# Patient Record
Sex: MALE | Race: WHITE | Employment: OTHER | URBAN - METROPOLITAN AREA
[De-identification: names, ages, dates, MRNs, and addresses within clinical notes are randomized per-mention and may not be internally consistent; named-entity substitution may affect disease eponyms.]

---

## 2018-10-29 ENCOUNTER — APPOINTMENT (OUTPATIENT)
Dept: CT IMAGING | Age: 83
End: 2018-10-29
Payer: MEDICARE

## 2018-10-29 ENCOUNTER — APPOINTMENT (OUTPATIENT)
Dept: GENERAL RADIOLOGY | Age: 83
End: 2018-10-29
Payer: MEDICARE

## 2018-10-29 ENCOUNTER — HOSPITAL ENCOUNTER (EMERGENCY)
Age: 83
Discharge: OTHER INSTITUTION | End: 2018-10-29
Attending: EMERGENCY MEDICINE
Payer: MEDICARE

## 2018-10-29 VITALS
WEIGHT: 170 LBS | SYSTOLIC BLOOD PRESSURE: 150 MMHG | BODY MASS INDEX: 21.83 KG/M2 | DIASTOLIC BLOOD PRESSURE: 73 MMHG | RESPIRATION RATE: 16 BRPM | OXYGEN SATURATION: 96 % | TEMPERATURE: 97.6 F | HEART RATE: 73 BPM

## 2018-10-29 DIAGNOSIS — S06.5XAA SUBDURAL HEMATOMA: Primary | ICD-10-CM

## 2018-10-29 LAB
A/G RATIO: 1.1 (ref 1.1–2.2)
ALBUMIN SERPL-MCNC: 3.8 G/DL (ref 3.4–5)
ALP BLD-CCNC: 97 U/L (ref 40–129)
ALT SERPL-CCNC: 9 U/L (ref 10–40)
ANION GAP SERPL CALCULATED.3IONS-SCNC: 12 MMOL/L (ref 3–16)
AST SERPL-CCNC: 11 U/L (ref 15–37)
BACTERIA: ABNORMAL /HPF
BASOPHILS ABSOLUTE: 0.2 K/UL (ref 0–0.2)
BASOPHILS RELATIVE PERCENT: 1.2 %
BILIRUB SERPL-MCNC: 0.8 MG/DL (ref 0–1)
BILIRUBIN URINE: NEGATIVE
BLOOD, URINE: ABNORMAL
BUN BLDV-MCNC: 29 MG/DL (ref 7–20)
CALCIUM SERPL-MCNC: 9.9 MG/DL (ref 8.3–10.6)
CHLORIDE BLD-SCNC: 101 MMOL/L (ref 99–110)
CLARITY: CLEAR
CO2: 26 MMOL/L (ref 21–32)
COLOR: YELLOW
CREAT SERPL-MCNC: 1 MG/DL (ref 0.8–1.3)
EKG ATRIAL RATE: 80 BPM
EKG DIAGNOSIS: NORMAL
EKG P AXIS: 52 DEGREES
EKG P-R INTERVAL: 158 MS
EKG Q-T INTERVAL: 404 MS
EKG QRS DURATION: 96 MS
EKG QTC CALCULATION (BAZETT): 465 MS
EKG R AXIS: 52 DEGREES
EKG T AXIS: 76 DEGREES
EKG VENTRICULAR RATE: 80 BPM
EOSINOPHILS ABSOLUTE: 0.3 K/UL (ref 0–0.6)
EOSINOPHILS RELATIVE PERCENT: 2.4 %
GFR AFRICAN AMERICAN: >60
GFR NON-AFRICAN AMERICAN: >60
GLOBULIN: 3.4 G/DL
GLUCOSE BLD-MCNC: 132 MG/DL (ref 70–99)
GLUCOSE BLD-MCNC: 133 MG/DL (ref 70–99)
GLUCOSE URINE: NEGATIVE MG/DL
HCT VFR BLD CALC: 33.3 % (ref 40.5–52.5)
HEMOGLOBIN: 11.2 G/DL (ref 13.5–17.5)
INR BLD: 1.14 (ref 0.86–1.14)
KETONES, URINE: NEGATIVE MG/DL
LEUKOCYTE ESTERASE, URINE: NEGATIVE
LYMPHOCYTES ABSOLUTE: 2.8 K/UL (ref 1–5.1)
LYMPHOCYTES RELATIVE PERCENT: 19.9 %
MAGNESIUM: 1.7 MG/DL (ref 1.8–2.4)
MCH RBC QN AUTO: 30.2 PG (ref 26–34)
MCHC RBC AUTO-ENTMCNC: 33.5 G/DL (ref 31–36)
MCV RBC AUTO: 90 FL (ref 80–100)
MICROSCOPIC EXAMINATION: YES
MONOCYTES ABSOLUTE: 1.4 K/UL (ref 0–1.3)
MONOCYTES RELATIVE PERCENT: 9.5 %
NEUTROPHILS ABSOLUTE: 9.6 K/UL (ref 1.7–7.7)
NEUTROPHILS RELATIVE PERCENT: 67 %
NITRITE, URINE: NEGATIVE
PDW BLD-RTO: 14.4 % (ref 12.4–15.4)
PERFORMED ON: ABNORMAL
PH UA: 6
PLATELET # BLD: 510 K/UL (ref 135–450)
PLATELET SLIDE REVIEW: ABNORMAL
PMV BLD AUTO: 7 FL (ref 5–10.5)
POTASSIUM SERPL-SCNC: 4 MMOL/L (ref 3.5–5.1)
PRO-BNP: 73 PG/ML (ref 0–449)
PROTEIN UA: NEGATIVE MG/DL
PROTHROMBIN TIME: 13 SEC (ref 9.8–13)
RBC # BLD: 3.7 M/UL (ref 4.2–5.9)
RBC # BLD: NORMAL 10*6/UL
RBC UA: ABNORMAL /HPF (ref 0–2)
SLIDE REVIEW: ABNORMAL
SODIUM BLD-SCNC: 139 MMOL/L (ref 136–145)
SPECIFIC GRAVITY UA: 1.01
TOTAL PROTEIN: 7.2 G/DL (ref 6.4–8.2)
TROPONIN: <0.01 NG/ML
URINE TYPE: ABNORMAL
UROBILINOGEN, URINE: 0.2 E.U./DL
WBC # BLD: 14.3 K/UL (ref 4–11)
WBC UA: ABNORMAL /HPF (ref 0–5)

## 2018-10-29 PROCEDURE — 70450 CT HEAD/BRAIN W/O DYE: CPT

## 2018-10-29 PROCEDURE — 70496 CT ANGIOGRAPHY HEAD: CPT

## 2018-10-29 PROCEDURE — 6360000004 HC RX CONTRAST MEDICATION: Performed by: EMERGENCY MEDICINE

## 2018-10-29 PROCEDURE — 83880 ASSAY OF NATRIURETIC PEPTIDE: CPT

## 2018-10-29 PROCEDURE — 99291 CRITICAL CARE FIRST HOUR: CPT

## 2018-10-29 PROCEDURE — 93010 ELECTROCARDIOGRAM REPORT: CPT | Performed by: INTERNAL MEDICINE

## 2018-10-29 PROCEDURE — 80053 COMPREHEN METABOLIC PANEL: CPT

## 2018-10-29 PROCEDURE — 85610 PROTHROMBIN TIME: CPT

## 2018-10-29 PROCEDURE — 84484 ASSAY OF TROPONIN QUANT: CPT

## 2018-10-29 PROCEDURE — 6360000002 HC RX W HCPCS: Performed by: EMERGENCY MEDICINE

## 2018-10-29 PROCEDURE — 85025 COMPLETE CBC W/AUTO DIFF WBC: CPT

## 2018-10-29 PROCEDURE — 93005 ELECTROCARDIOGRAM TRACING: CPT | Performed by: EMERGENCY MEDICINE

## 2018-10-29 PROCEDURE — 99292 CRITICAL CARE ADDL 30 MIN: CPT

## 2018-10-29 PROCEDURE — 81001 URINALYSIS AUTO W/SCOPE: CPT

## 2018-10-29 PROCEDURE — 70498 CT ANGIOGRAPHY NECK: CPT

## 2018-10-29 PROCEDURE — 83735 ASSAY OF MAGNESIUM: CPT

## 2018-10-29 PROCEDURE — 71045 X-RAY EXAM CHEST 1 VIEW: CPT

## 2018-10-29 RX ORDER — LEVOTHYROXINE SODIUM 0.15 MG/1
150 TABLET ORAL
COMMUNITY

## 2018-10-29 RX ORDER — GINSENG 100 MG
1 CAPSULE ORAL
COMMUNITY
Start: 2018-10-22 | End: 2020-10-13 | Stop reason: ALTCHOICE

## 2018-10-29 RX ORDER — POLYETHYLENE GLYCOL 3350 17 G/17G
17 POWDER, FOR SOLUTION ORAL
COMMUNITY
Start: 2018-10-22 | End: 2020-10-13 | Stop reason: ALTCHOICE

## 2018-10-29 RX ORDER — CHLORTHALIDONE 25 MG/1
12.5 TABLET ORAL
COMMUNITY
End: 2020-10-13 | Stop reason: ALTCHOICE

## 2018-10-29 RX ORDER — HEPARIN SODIUM 5000 [USP'U]/ML
5000 INJECTION, SOLUTION INTRAVENOUS; SUBCUTANEOUS
COMMUNITY
Start: 2018-10-22 | End: 2020-10-13 | Stop reason: ALTCHOICE

## 2018-10-29 RX ORDER — MAGNESIUM SULFATE HEPTAHYDRATE 500 MG/ML
2 INJECTION, SOLUTION INTRAMUSCULAR; INTRAVENOUS ONCE
Status: DISCONTINUED | OUTPATIENT
Start: 2018-10-29 | End: 2018-10-29 | Stop reason: CLARIF

## 2018-10-29 RX ORDER — SENNA AND DOCUSATE SODIUM 50; 8.6 MG/1; MG/1
1 TABLET, FILM COATED ORAL
COMMUNITY
Start: 2018-10-22 | End: 2020-10-13 | Stop reason: ALTCHOICE

## 2018-10-29 RX ORDER — TRAMADOL HYDROCHLORIDE 50 MG/1
50 TABLET ORAL EVERY 6 HOURS PRN
Status: ON HOLD | COMMUNITY
End: 2020-10-16 | Stop reason: HOSPADM

## 2018-10-29 RX ORDER — MAGNESIUM SULFATE IN WATER 40 MG/ML
2 INJECTION, SOLUTION INTRAVENOUS ONCE
Status: COMPLETED | OUTPATIENT
Start: 2018-10-29 | End: 2018-10-29

## 2018-10-29 RX ADMIN — IOPAMIDOL 80 ML: 755 INJECTION, SOLUTION INTRAVENOUS at 09:50

## 2018-10-29 RX ADMIN — MAGNESIUM SULFATE HEPTAHYDRATE 2 G: 40 INJECTION, SOLUTION INTRAVENOUS at 09:38
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
## 2020-10-13 ENCOUNTER — APPOINTMENT (OUTPATIENT)
Dept: GENERAL RADIOLOGY | Age: 85
DRG: 682 | End: 2020-10-13
Payer: MEDICARE

## 2020-10-13 ENCOUNTER — APPOINTMENT (OUTPATIENT)
Dept: CT IMAGING | Age: 85
DRG: 682 | End: 2020-10-13
Payer: MEDICARE

## 2020-10-13 ENCOUNTER — HOSPITAL ENCOUNTER (INPATIENT)
Age: 85
LOS: 2 days | Discharge: NURSING, REHAB OR LONG-TERM CARE | DRG: 682 | End: 2020-10-16
Attending: EMERGENCY MEDICINE | Admitting: INTERNAL MEDICINE
Payer: MEDICARE

## 2020-10-13 LAB
A/G RATIO: 1.8 (ref 1.1–2.2)
ALBUMIN SERPL-MCNC: 4.3 G/DL (ref 3.4–5)
ALP BLD-CCNC: 55 U/L (ref 40–129)
ALT SERPL-CCNC: 23 U/L (ref 10–40)
ANION GAP SERPL CALCULATED.3IONS-SCNC: 12 MMOL/L (ref 3–16)
AST SERPL-CCNC: 46 U/L (ref 15–37)
BASOPHILS ABSOLUTE: 0.1 K/UL (ref 0–0.2)
BASOPHILS RELATIVE PERCENT: 1.6 %
BILIRUB SERPL-MCNC: 0.5 MG/DL (ref 0–1)
BUN BLDV-MCNC: 23 MG/DL (ref 7–20)
CALCIUM SERPL-MCNC: 10.1 MG/DL (ref 8.3–10.6)
CHLORIDE BLD-SCNC: 95 MMOL/L (ref 99–110)
CO2: 28 MMOL/L (ref 21–32)
CREAT SERPL-MCNC: 1.6 MG/DL (ref 0.8–1.3)
EOSINOPHILS ABSOLUTE: 0.1 K/UL (ref 0–0.6)
EOSINOPHILS RELATIVE PERCENT: 0.8 %
GFR AFRICAN AMERICAN: 50
GFR NON-AFRICAN AMERICAN: 41
GLOBULIN: 2.4 G/DL
GLUCOSE BLD-MCNC: 81 MG/DL (ref 70–99)
HCT VFR BLD CALC: 39.8 % (ref 40.5–52.5)
HEMOGLOBIN: 13.9 G/DL (ref 13.5–17.5)
LIPASE: 49 U/L (ref 13–60)
LYMPHOCYTES ABSOLUTE: 3.7 K/UL (ref 1–5.1)
LYMPHOCYTES RELATIVE PERCENT: 44.3 %
MAGNESIUM: 1.4 MG/DL (ref 1.8–2.4)
MCH RBC QN AUTO: 33.1 PG (ref 26–34)
MCHC RBC AUTO-ENTMCNC: 34.9 G/DL (ref 31–36)
MCV RBC AUTO: 94.7 FL (ref 80–100)
MONOCYTES ABSOLUTE: 0.5 K/UL (ref 0–1.3)
MONOCYTES RELATIVE PERCENT: 5.9 %
NEUTROPHILS ABSOLUTE: 4 K/UL (ref 1.7–7.7)
NEUTROPHILS RELATIVE PERCENT: 47.4 %
PDW BLD-RTO: 16.1 % (ref 12.4–15.4)
PLATELET # BLD: 284 K/UL (ref 135–450)
PMV BLD AUTO: 8 FL (ref 5–10.5)
POTASSIUM SERPL-SCNC: 2.9 MMOL/L (ref 3.5–5.1)
PRO-BNP: 204 PG/ML (ref 0–449)
RBC # BLD: 4.2 M/UL (ref 4.2–5.9)
SODIUM BLD-SCNC: 135 MMOL/L (ref 136–145)
TOTAL PROTEIN: 6.7 G/DL (ref 6.4–8.2)
TROPONIN: <0.01 NG/ML
WBC # BLD: 8.3 K/UL (ref 4–11)

## 2020-10-13 PROCEDURE — 83036 HEMOGLOBIN GLYCOSYLATED A1C: CPT

## 2020-10-13 PROCEDURE — 71045 X-RAY EXAM CHEST 1 VIEW: CPT

## 2020-10-13 PROCEDURE — 84484 ASSAY OF TROPONIN QUANT: CPT

## 2020-10-13 PROCEDURE — 84480 ASSAY TRIIODOTHYRONINE (T3): CPT

## 2020-10-13 PROCEDURE — G0480 DRUG TEST DEF 1-7 CLASSES: HCPCS

## 2020-10-13 PROCEDURE — 72125 CT NECK SPINE W/O DYE: CPT

## 2020-10-13 PROCEDURE — 96361 HYDRATE IV INFUSION ADD-ON: CPT

## 2020-10-13 PROCEDURE — 83735 ASSAY OF MAGNESIUM: CPT

## 2020-10-13 PROCEDURE — 83880 ASSAY OF NATRIURETIC PEPTIDE: CPT

## 2020-10-13 PROCEDURE — 80053 COMPREHEN METABOLIC PANEL: CPT

## 2020-10-13 PROCEDURE — 85025 COMPLETE CBC W/AUTO DIFF WBC: CPT

## 2020-10-13 PROCEDURE — 93005 ELECTROCARDIOGRAM TRACING: CPT | Performed by: EMERGENCY MEDICINE

## 2020-10-13 PROCEDURE — 84439 ASSAY OF FREE THYROXINE: CPT

## 2020-10-13 PROCEDURE — 99285 EMERGENCY DEPT VISIT HI MDM: CPT

## 2020-10-13 PROCEDURE — 6370000000 HC RX 637 (ALT 250 FOR IP): Performed by: EMERGENCY MEDICINE

## 2020-10-13 PROCEDURE — 83690 ASSAY OF LIPASE: CPT

## 2020-10-13 PROCEDURE — 84443 ASSAY THYROID STIM HORMONE: CPT

## 2020-10-13 PROCEDURE — 2580000003 HC RX 258: Performed by: EMERGENCY MEDICINE

## 2020-10-13 PROCEDURE — 36415 COLL VENOUS BLD VENIPUNCTURE: CPT

## 2020-10-13 PROCEDURE — 70450 CT HEAD/BRAIN W/O DYE: CPT

## 2020-10-13 RX ORDER — 0.9 % SODIUM CHLORIDE 0.9 %
1000 INTRAVENOUS SOLUTION INTRAVENOUS ONCE
Status: COMPLETED | OUTPATIENT
Start: 2020-10-13 | End: 2020-10-14

## 2020-10-13 RX ORDER — MAGNESIUM SULFATE IN WATER 40 MG/ML
2 INJECTION, SOLUTION INTRAVENOUS ONCE
Status: COMPLETED | OUTPATIENT
Start: 2020-10-14 | End: 2020-10-14

## 2020-10-13 RX ADMIN — LEVOTHYROXINE SODIUM 150 MCG: 25 TABLET ORAL at 22:59

## 2020-10-13 RX ADMIN — SODIUM CHLORIDE 1000 ML: 9 INJECTION, SOLUTION INTRAVENOUS at 23:00

## 2020-10-14 PROBLEM — N17.9 AKI (ACUTE KIDNEY INJURY) (HCC): Status: ACTIVE | Noted: 2020-10-14

## 2020-10-14 PROBLEM — E43 SEVERE PROTEIN-CALORIE MALNUTRITION (HCC): Status: ACTIVE | Noted: 2020-10-14

## 2020-10-14 LAB
A/G RATIO: 1.8 (ref 1.1–2.2)
ALBUMIN SERPL-MCNC: 3.8 G/DL (ref 3.4–5)
ALP BLD-CCNC: 53 U/L (ref 40–129)
ALT SERPL-CCNC: 20 U/L (ref 10–40)
AMPHETAMINE SCREEN, URINE: NORMAL
ANION GAP SERPL CALCULATED.3IONS-SCNC: 10 MMOL/L (ref 3–16)
ANION GAP SERPL CALCULATED.3IONS-SCNC: 15 MMOL/L (ref 3–16)
AST SERPL-CCNC: 37 U/L (ref 15–37)
BACTERIA: ABNORMAL /HPF
BARBITURATE SCREEN URINE: NORMAL
BASOPHILS ABSOLUTE: 0 K/UL (ref 0–0.2)
BASOPHILS RELATIVE PERCENT: 0.4 %
BENZODIAZEPINE SCREEN, URINE: NORMAL
BILIRUB SERPL-MCNC: 0.4 MG/DL (ref 0–1)
BILIRUBIN URINE: NEGATIVE
BLOOD, URINE: ABNORMAL
BUN BLDV-MCNC: 19 MG/DL (ref 7–20)
BUN BLDV-MCNC: 19 MG/DL (ref 7–20)
CALCIUM SERPL-MCNC: 9.3 MG/DL (ref 8.3–10.6)
CALCIUM SERPL-MCNC: 9.4 MG/DL (ref 8.3–10.6)
CANNABINOID SCREEN URINE: NORMAL
CHLORIDE BLD-SCNC: 95 MMOL/L (ref 99–110)
CHLORIDE BLD-SCNC: 99 MMOL/L (ref 99–110)
CLARITY: ABNORMAL
CO2: 24 MMOL/L (ref 21–32)
CO2: 28 MMOL/L (ref 21–32)
COCAINE METABOLITE SCREEN URINE: NORMAL
COLOR: YELLOW
CREAT SERPL-MCNC: 1.3 MG/DL (ref 0.8–1.3)
CREAT SERPL-MCNC: 1.4 MG/DL (ref 0.8–1.3)
EKG ATRIAL RATE: 62 BPM
EKG DIAGNOSIS: NORMAL
EKG P AXIS: 33 DEGREES
EKG P-R INTERVAL: 180 MS
EKG Q-T INTERVAL: 450 MS
EKG QRS DURATION: 106 MS
EKG QTC CALCULATION (BAZETT): 456 MS
EKG R AXIS: 52 DEGREES
EKG T AXIS: 47 DEGREES
EKG VENTRICULAR RATE: 62 BPM
EOSINOPHILS ABSOLUTE: 0.1 K/UL (ref 0–0.6)
EOSINOPHILS RELATIVE PERCENT: 0.9 %
ESTIMATED AVERAGE GLUCOSE: 111.2 MG/DL
ETHANOL: NORMAL MG/DL (ref 0–0.08)
GFR AFRICAN AMERICAN: 58
GFR AFRICAN AMERICAN: >60
GFR NON-AFRICAN AMERICAN: 48
GFR NON-AFRICAN AMERICAN: 52
GLOBULIN: 2.1 G/DL
GLUCOSE BLD-MCNC: 110 MG/DL (ref 70–99)
GLUCOSE BLD-MCNC: 112 MG/DL (ref 70–99)
GLUCOSE BLD-MCNC: 120 MG/DL (ref 70–99)
GLUCOSE BLD-MCNC: 84 MG/DL (ref 70–99)
GLUCOSE BLD-MCNC: 90 MG/DL (ref 70–99)
GLUCOSE BLD-MCNC: 94 MG/DL (ref 70–99)
GLUCOSE URINE: NEGATIVE MG/DL
HBA1C MFR BLD: 5.5 %
HCT VFR BLD CALC: 37.3 % (ref 40.5–52.5)
HEMOGLOBIN: 13 G/DL (ref 13.5–17.5)
KETONES, URINE: NEGATIVE MG/DL
LEUKOCYTE ESTERASE, URINE: ABNORMAL
LYMPHOCYTES ABSOLUTE: 3.2 K/UL (ref 1–5.1)
LYMPHOCYTES RELATIVE PERCENT: 41.4 %
Lab: NORMAL
MAGNESIUM: 1.6 MG/DL (ref 1.8–2.4)
MAGNESIUM: 1.9 MG/DL (ref 1.8–2.4)
MCH RBC QN AUTO: 32.8 PG (ref 26–34)
MCHC RBC AUTO-ENTMCNC: 34.8 G/DL (ref 31–36)
MCV RBC AUTO: 94.2 FL (ref 80–100)
METHADONE SCREEN, URINE: NORMAL
MICROSCOPIC EXAMINATION: YES
MONOCYTES ABSOLUTE: 0.6 K/UL (ref 0–1.3)
MONOCYTES RELATIVE PERCENT: 7.1 %
NEUTROPHILS ABSOLUTE: 3.9 K/UL (ref 1.7–7.7)
NEUTROPHILS RELATIVE PERCENT: 50.2 %
NITRITE, URINE: NEGATIVE
OPIATE SCREEN URINE: NORMAL
OXYCODONE URINE: NORMAL
PDW BLD-RTO: 15.8 % (ref 12.4–15.4)
PERFORMED ON: ABNORMAL
PERFORMED ON: ABNORMAL
PERFORMED ON: NORMAL
PERFORMED ON: NORMAL
PH UA: 6
PH UA: 6 (ref 5–8)
PHENCYCLIDINE SCREEN URINE: NORMAL
PHOSPHORUS: 1.8 MG/DL (ref 2.5–4.9)
PLATELET # BLD: 248 K/UL (ref 135–450)
PMV BLD AUTO: 8.3 FL (ref 5–10.5)
POTASSIUM REFLEX MAGNESIUM: 2.7 MMOL/L (ref 3.5–5.1)
POTASSIUM REFLEX MAGNESIUM: 3.3 MMOL/L (ref 3.5–5.1)
PROPOXYPHENE SCREEN: NORMAL
PROTEIN UA: 30 MG/DL
RBC # BLD: 3.96 M/UL (ref 4.2–5.9)
RBC UA: ABNORMAL /HPF (ref 0–4)
SODIUM BLD-SCNC: 133 MMOL/L (ref 136–145)
SODIUM BLD-SCNC: 138 MMOL/L (ref 136–145)
SPECIFIC GRAVITY UA: 1.02 (ref 1–1.03)
T3 TOTAL: <0.2 NG/ML (ref 0.8–2)
T4 FREE: <0.1 NG/DL (ref 0.9–1.8)
TOTAL PROTEIN: 5.9 G/DL (ref 6.4–8.2)
TSH REFLEX: 100 UIU/ML (ref 0.27–4.2)
URINE REFLEX TO CULTURE: YES
URINE TYPE: ABNORMAL
UROBILINOGEN, URINE: 0.2 E.U./DL
WBC # BLD: 7.8 K/UL (ref 4–11)
WBC UA: ABNORMAL /HPF (ref 0–5)

## 2020-10-14 PROCEDURE — 96365 THER/PROPH/DIAG IV INF INIT: CPT

## 2020-10-14 PROCEDURE — 6360000002 HC RX W HCPCS: Performed by: EMERGENCY MEDICINE

## 2020-10-14 PROCEDURE — 80307 DRUG TEST PRSMV CHEM ANLYZR: CPT

## 2020-10-14 PROCEDURE — 97162 PT EVAL MOD COMPLEX 30 MIN: CPT

## 2020-10-14 PROCEDURE — 83735 ASSAY OF MAGNESIUM: CPT

## 2020-10-14 PROCEDURE — 96375 TX/PRO/DX INJ NEW DRUG ADDON: CPT

## 2020-10-14 PROCEDURE — 80053 COMPREHEN METABOLIC PANEL: CPT

## 2020-10-14 PROCEDURE — 1200000000 HC SEMI PRIVATE

## 2020-10-14 PROCEDURE — 87077 CULTURE AEROBIC IDENTIFY: CPT

## 2020-10-14 PROCEDURE — 81001 URINALYSIS AUTO W/SCOPE: CPT

## 2020-10-14 PROCEDURE — 87086 URINE CULTURE/COLONY COUNT: CPT

## 2020-10-14 PROCEDURE — 96366 THER/PROPH/DIAG IV INF ADDON: CPT

## 2020-10-14 PROCEDURE — 97530 THERAPEUTIC ACTIVITIES: CPT

## 2020-10-14 PROCEDURE — 97116 GAIT TRAINING THERAPY: CPT

## 2020-10-14 PROCEDURE — 93010 ELECTROCARDIOGRAM REPORT: CPT | Performed by: INTERNAL MEDICINE

## 2020-10-14 PROCEDURE — 84100 ASSAY OF PHOSPHORUS: CPT

## 2020-10-14 PROCEDURE — G0378 HOSPITAL OBSERVATION PER HR: HCPCS

## 2020-10-14 PROCEDURE — 97166 OT EVAL MOD COMPLEX 45 MIN: CPT

## 2020-10-14 PROCEDURE — 2580000003 HC RX 258: Performed by: EMERGENCY MEDICINE

## 2020-10-14 PROCEDURE — 97535 SELF CARE MNGMENT TRAINING: CPT

## 2020-10-14 PROCEDURE — 85025 COMPLETE CBC W/AUTO DIFF WBC: CPT

## 2020-10-14 PROCEDURE — 6360000002 HC RX W HCPCS: Performed by: INTERNAL MEDICINE

## 2020-10-14 PROCEDURE — 2500000003 HC RX 250 WO HCPCS: Performed by: INTERNAL MEDICINE

## 2020-10-14 PROCEDURE — 36415 COLL VENOUS BLD VENIPUNCTURE: CPT

## 2020-10-14 PROCEDURE — 99222 1ST HOSP IP/OBS MODERATE 55: CPT | Performed by: PSYCHIATRY & NEUROLOGY

## 2020-10-14 PROCEDURE — 87088 URINE BACTERIA CULTURE: CPT

## 2020-10-14 PROCEDURE — 87186 SC STD MICRODIL/AGAR DIL: CPT

## 2020-10-14 PROCEDURE — 6370000000 HC RX 637 (ALT 250 FOR IP): Performed by: EMERGENCY MEDICINE

## 2020-10-14 RX ORDER — DEXTROSE MONOHYDRATE 50 MG/ML
100 INJECTION, SOLUTION INTRAVENOUS PRN
Status: DISCONTINUED | OUTPATIENT
Start: 2020-10-14 | End: 2020-10-16 | Stop reason: HOSPADM

## 2020-10-14 RX ORDER — NICOTINE POLACRILEX 4 MG
15 LOZENGE BUCCAL PRN
Status: DISCONTINUED | OUTPATIENT
Start: 2020-10-14 | End: 2020-10-16 | Stop reason: HOSPADM

## 2020-10-14 RX ORDER — POTASSIUM CHLORIDE 20 MEQ/1
40 TABLET, EXTENDED RELEASE ORAL PRN
Status: DISCONTINUED | OUTPATIENT
Start: 2020-10-14 | End: 2020-10-16 | Stop reason: HOSPADM

## 2020-10-14 RX ORDER — MAGNESIUM SULFATE IN WATER 40 MG/ML
2 INJECTION, SOLUTION INTRAVENOUS ONCE
Status: DISCONTINUED | OUTPATIENT
Start: 2020-10-14 | End: 2020-10-14

## 2020-10-14 RX ORDER — LEVOTHYROXINE SODIUM 0.15 MG/1
150 TABLET ORAL DAILY
Status: DISCONTINUED | OUTPATIENT
Start: 2020-10-15 | End: 2020-10-16 | Stop reason: HOSPADM

## 2020-10-14 RX ORDER — PROMETHAZINE HYDROCHLORIDE 25 MG/1
12.5 TABLET ORAL EVERY 6 HOURS PRN
Status: DISCONTINUED | OUTPATIENT
Start: 2020-10-14 | End: 2020-10-16 | Stop reason: HOSPADM

## 2020-10-14 RX ORDER — ACETAMINOPHEN 325 MG/1
650 TABLET ORAL EVERY 6 HOURS PRN
Status: DISCONTINUED | OUTPATIENT
Start: 2020-10-14 | End: 2020-10-16 | Stop reason: HOSPADM

## 2020-10-14 RX ORDER — DEXTROSE, SODIUM CHLORIDE, AND POTASSIUM CHLORIDE 5; .45; .15 G/100ML; G/100ML; G/100ML
INJECTION INTRAVENOUS CONTINUOUS
Status: DISCONTINUED | OUTPATIENT
Start: 2020-10-14 | End: 2020-10-16 | Stop reason: HOSPADM

## 2020-10-14 RX ORDER — DEXTROSE MONOHYDRATE 25 G/50ML
12.5 INJECTION, SOLUTION INTRAVENOUS PRN
Status: DISCONTINUED | OUTPATIENT
Start: 2020-10-14 | End: 2020-10-16 | Stop reason: HOSPADM

## 2020-10-14 RX ORDER — LEVOTHYROXINE SODIUM 0.15 MG/1
150 TABLET ORAL DAILY
Status: DISCONTINUED | OUTPATIENT
Start: 2020-10-14 | End: 2020-10-14

## 2020-10-14 RX ORDER — POLYETHYLENE GLYCOL 3350 17 G/17G
17 POWDER, FOR SOLUTION ORAL DAILY PRN
Status: DISCONTINUED | OUTPATIENT
Start: 2020-10-14 | End: 2020-10-16 | Stop reason: HOSPADM

## 2020-10-14 RX ORDER — POTASSIUM CHLORIDE 7.45 MG/ML
10 INJECTION INTRAVENOUS PRN
Status: DISCONTINUED | OUTPATIENT
Start: 2020-10-14 | End: 2020-10-16 | Stop reason: HOSPADM

## 2020-10-14 RX ORDER — SODIUM CHLORIDE 0.9 % (FLUSH) 0.9 %
10 SYRINGE (ML) INJECTION EVERY 12 HOURS SCHEDULED
Status: DISCONTINUED | OUTPATIENT
Start: 2020-10-14 | End: 2020-10-16 | Stop reason: HOSPADM

## 2020-10-14 RX ORDER — MAGNESIUM SULFATE IN WATER 40 MG/ML
2 INJECTION, SOLUTION INTRAVENOUS PRN
Status: DISCONTINUED | OUTPATIENT
Start: 2020-10-14 | End: 2020-10-16 | Stop reason: HOSPADM

## 2020-10-14 RX ORDER — ONDANSETRON 2 MG/ML
4 INJECTION INTRAMUSCULAR; INTRAVENOUS EVERY 6 HOURS PRN
Status: DISCONTINUED | OUTPATIENT
Start: 2020-10-14 | End: 2020-10-16 | Stop reason: HOSPADM

## 2020-10-14 RX ORDER — ACETAMINOPHEN 650 MG/1
650 SUPPOSITORY RECTAL EVERY 6 HOURS PRN
Status: DISCONTINUED | OUTPATIENT
Start: 2020-10-14 | End: 2020-10-16 | Stop reason: HOSPADM

## 2020-10-14 RX ORDER — SODIUM CHLORIDE 0.9 % (FLUSH) 0.9 %
10 SYRINGE (ML) INJECTION PRN
Status: DISCONTINUED | OUTPATIENT
Start: 2020-10-14 | End: 2020-10-16 | Stop reason: HOSPADM

## 2020-10-14 RX ADMIN — POTASSIUM BICARBONATE 60 MEQ: 782 TABLET, EFFERVESCENT ORAL at 00:11

## 2020-10-14 RX ADMIN — POTASSIUM CHLORIDE 10 MEQ: 7.46 INJECTION, SOLUTION INTRAVENOUS at 14:35

## 2020-10-14 RX ADMIN — POTASSIUM CHLORIDE 10 MEQ: 7.46 INJECTION, SOLUTION INTRAVENOUS at 15:52

## 2020-10-14 RX ADMIN — CEFTRIAXONE SODIUM 1 G: 1 INJECTION, POWDER, FOR SOLUTION INTRAMUSCULAR; INTRAVENOUS at 01:54

## 2020-10-14 RX ADMIN — MAGNESIUM SULFATE HEPTAHYDRATE 2 G: 40 INJECTION, SOLUTION INTRAVENOUS at 07:47

## 2020-10-14 RX ADMIN — POTASSIUM CHLORIDE 10 MEQ: 7.46 INJECTION, SOLUTION INTRAVENOUS at 10:26

## 2020-10-14 RX ADMIN — MAGNESIUM SULFATE 2 G: 2 INJECTION INTRAVENOUS at 00:11

## 2020-10-14 RX ADMIN — POTASSIUM CHLORIDE, DEXTROSE MONOHYDRATE AND SODIUM CHLORIDE: 150; 5; 450 INJECTION, SOLUTION INTRAVENOUS at 04:00

## 2020-10-14 RX ADMIN — POTASSIUM CHLORIDE, DEXTROSE MONOHYDRATE AND SODIUM CHLORIDE: 150; 5; 450 INJECTION, SOLUTION INTRAVENOUS at 21:24

## 2020-10-14 RX ADMIN — POTASSIUM CHLORIDE 10 MEQ: 7.46 INJECTION, SOLUTION INTRAVENOUS at 13:20

## 2020-10-14 RX ADMIN — POTASSIUM CHLORIDE 10 MEQ: 7.46 INJECTION, SOLUTION INTRAVENOUS at 06:28

## 2020-10-14 NOTE — PROGRESS NOTES
Daughter, Jimi Smith, updated on POC. Explained that [place] did not accept patient. Daughter says she has also been talking to [facility] [place] and requests he try to be placed there. Will updated CM. No needs at this time. Will continue to monitor.

## 2020-10-14 NOTE — PROGRESS NOTES
Bedside report given to [facility], RN at this time. Lin aware to stop K of 1st bag, infuse Mg & then cont w/ K. RN is also aware to phone daughter after 9am. Pt resting quietly in bed. Care transferred at this time.  Gerson Tavarez

## 2020-10-14 NOTE — ED NOTES
0117- call to the [facility] for transfer for pt due to preference and care  Per Dr. Adam Buchanan. 0200- Dr. Adam Buchanan spoke with Dr. Jose Portillo with [facility] hospitalist.     Stanley Benton- pt does not want to go to [facility] anymore. Consult to Dr. Nilsa Mackenzie. 0215-Karen Scott with [facility] agreed to keep pt at [place] spoke with Dr. Adam Buchanan. 0216- call [facility] to cancel transfer at this time.      Desiree Mojica  10/14/20 0023

## 2020-10-14 NOTE — PROGRESS NOTES
Pt admitted to [place] [address] room 224 in stable in condition. Oriented to environment. Admission questions completed. Pt is A/O but is very [place]. POC reviewed with pt. Initial assessment complete. Bed alarm in place. Call light in reach. Will cont to monitor.  Jeferson Colvin

## 2020-10-14 NOTE — FLOWSHEET NOTE
10/14/20 0757   Vital Signs   Temp 97.6 °F (36.4 °C)   Temp Source Oral   Pulse 57   Heart Rate Source Monitor   /77   BP Location Left upper arm   Patient Position Lying left side   Oxygen Therapy   SpO2 96 %   O2 Device None (Room air)   AM assessment complete. Vital signs stable. IV magnesium infusing at this time, will follow with IV potassium. Pt [place], but alert to place, person. Resting at this time. No other needs identified at this time. Will continue to monitor.

## 2020-10-14 NOTE — PROGRESS NOTES
Mg 1.6 & will be replaced after 1st bag of mg & then will cont to replace K per Dr Naseem Edwards.  Neal Patch

## 2020-10-14 NOTE — PROGRESS NOTES
4 Eyes Skin Assessment     The patient is being assess for   Admission    I agree that 2 RN's have performed a thorough Head to Toe Skin Assessment on the patient. ALL assessment sites listed below have been assessed. Areas assessed by both nurses:   [x]   Head, Face, and Ears   [x]   Shoulders, Back, and Chest, Abdomen  [x]   Arms, Elbows, and Hands   [x]   Coccyx, Sacrum, and Ischium  [x]   Legs, Feet, and Heels        Mepilex to buttocks for prevention. Pt has a wound to right lateral nose. MERISSA. See wound flowsheet for measurements & photo. **SHARE this note so that the co-signing nurse is able to place an eSignature**    Co-signer eSignature: Electronically signed by Rachell Huntley RN on 10/14/20 at 3:41 AM EDT    Does the Patient have Skin Breakdown?   No          Gavin Prevention initiated:  No   Wound Care Orders initiated:  No      WOC nurse consulted for Pressure Injury (Stage 3,4, Unstageable, DTI, NWPT, Complex wounds)and New or Established Ostomies:  No      Primary Nurse eSignature: Electronically signed by Osmin Mccollum RN on 10/14/20 at 3:41 AM EDT

## 2020-10-14 NOTE — PROGRESS NOTES
Physician Progress Note      Rhonda De León  CSN #:                  [id number]  :                       1/10/1933  ADMIT DATE:       10/13/2020 9:01 PM  [address] [place] DATE:  RESPONDING  PROVIDER #:        Loreli Favre APRN - CNP          QUERY TEXT:    Patient admitted with CHANELL, noted to have CKD per H&P. If possible, please   document in progress notes and discharge summary if you are evaluating and/or   treating any of the following: The medical record reflects the following:  Risk Factors: CHANELL on probable CKD  Clinical Indicators: 2018 GFR >60 now 41-52. Cr 1.6  Treatment: monitor labs, I&O, monitor vitals, IV NS 1000ml bolus    Thank You Rachel Paniagua RN, [email or web address]. [employer]  Options provided:  -- CKD Stage 1 GFR>90  -- CKD Stage 2 GFR 60-90  -- CKD Stage 3 GFR 30-59  -- Other - I will add my own diagnosis  -- Disagree - Not applicable / Not valid  -- Disagree - Clinically unable to determine / Unknown  -- Refer to Clinical Documentation Reviewer    PROVIDER RESPONSE TEXT:    This patient has CKD Stage 3.     Query created by: Shae Jones on 10/14/2020 10:07 AM      Electronically signed by:  Loreli Favre APRN - CNP 10/14/2020 12:52 PM

## 2020-10-14 NOTE — FLOWSHEET NOTE
10/14/20 1158   Encounter Summary   Services provided to: Patient   Referral/Consult From: Physician   Support System Family members   Continue Visiting   (10/14 Initial visit/spt/prayer w/very [place] pt.)   Complexity of Encounter Low   Length of Encounter 15 minutes   Spiritual/Scientology   Type Spiritual support   Assessment Calm; Approachable;Coping;Peaceful  (Reads Bible daily & appreciates prayer.)   Intervention Active listening;Explored feelings, thoughts, concerns;Explored coping resources;Nurtured hope;Prayer;Sustaining presence/ Ministry of presence   Outcome Comfort;Expressed gratitude;Expressed feelings of marlo, peace, and/or awe;Expressed feelings/needs/concerns;Coping;Encouraged;Receptive

## 2020-10-14 NOTE — CONSULTS
treatment history. Past Medical History:  Past Medical History:   Diagnosis Date    Diabetes mellitus ([facility] [address]     Hypertension     Thyroid disease        Home Medications:  Prior to Admission medications    Medication Sig Start Date End Date Taking? Authorizing Provider   levothyroxine (SYNTHROID) 150 MCG tablet Take 150 mcg by mouth   Yes Historical Provider, MD   Acetaminophen 325 MG CAPS Take 975 mg by mouth 10/22/18   Historical Provider, MD   metFORMIN (GLUCOPHAGE) 850 MG tablet Take 850 mg by mouth    Historical Provider, MD   traMADol (ULTRAM) 50 MG tablet Take 50 mg by mouth every 6 hours as needed for Pain. Andre Bearden Historical Provider, MD   Misc. Devices (WALKER) MISC 1 each by Does not apply route daily Dispense and Fit for injury to lower extremity and weakness. 11/6/16   DANIELLE Dobson - CNP       Chemical Dependency History:   TOBACCO:   reports that he has been smoking. He has been smoking about 0.50 packs per day. He does not have any smokeless tobacco history on file. ETOH:   reports no history of alcohol use. Family Hx:    History reviewed. No pertinent family history. Social Hx:   Lives alone. Has adult children who live out of state. Dtr is POA.     Current Medications Ordered:   [START ON 10/15/2020] cefTRIAXone (ROCEPHIN) IV  1 g Intravenous Q24H    sodium chloride flush  10 mL Intravenous 2 times per day    insulin lispro  0-6 Units Subcutaneous TID WC    insulin lispro  0-3 Units Subcutaneous Nightly    [START ON 10/15/2020] levothyroxine  150 mcg Oral Daily      PRN Meds: sodium chloride flush, acetaminophen **OR** acetaminophen, polyethylene glycol, glucose, dextrose, glucagon (rDNA), dextrose, potassium chloride **OR** potassium alternative oral replacement **OR** potassium chloride, magnesium sulfate, promethazine **OR** ondansetron     ROS:  See Medical H&PE      PE:    /81   Pulse 56   Temp 97.5 °F (36.4 °C) (Oral)   Resp 16   Ht 6' (1.829 m)   Wt 150 lb 6.4 oz (68.2 kg)   SpO2 98%   BMI 20.40 kg/m²       Motor / Gait: no abnormalities noted, gait deferred  AIMS: 0    Mental Status Examination:    Appearance: WM, appears stated age, lying in bed, wearing hospital gown, disheveled grooming and fair hygiene thin/frail    Behavior/Attitude toward examiner:  cooperative, limited eye contact  Speech:  Slow, overall impoverished   Mood: \"Fine\"  Affect:  Constricted  Thought processes:  Impoverished  Thought Content: Denies SI, Denies HI, no delusions voiced, no obsessions  Perceptions: Denies AVH, not RTIS  Attention: limited  Abstraction: concrete  Cognition: Average fund of knowledge, Alert and oriented to person, place, time, and situation, recall appears relatively spared compared to executive function which is notably impaired  Insight: Minimal insight   Judgment: Impaired judgment      LAB: Reviewed all labs obtained during admission to date. Head CT:  FINDINGS:    BRAIN/VENTRICLES: There is no acute intracranial hemorrhage.  Again noted are    bilateral subdural effusions/hygromas. Pam Cheeks is now subtle increased    density, just greater than CSF, within the periphery of both collections. Both collections are smaller compared to the prior exam. Pam Cheeks is mild    patchy periventricular and subcortical white matter low attenuation without    mass effect or edema. Pam Cheeks is no hydrocephalus or midline shift.         ORBITS: The visualized portion of the orbits demonstrate no acute abnormality.         SINUSES: The visualized paranasal sinuses and mastoid air cells demonstrate    no acute abnormality.         SOFT TISSUES/SKULL:  No acute abnormality of the visualized skull or soft    tissues.  Tiny metallic density is again seen in the subcutaneous fat    overlying the medial left frontal bone.          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 Inpatient psychiatric admission is not indicated in this case. Patient presents with signs that suggest possible moderate vascular dementia, but he is not currently exhibiting behavioral disturbance that would require psychiatric treatment. 2.  OT has been consulted. I agree with this. Patient should have [address] and formal functional assessment of IADL and ADL capabilities performed to allow for an objective recommendation of most appropriate living arrangements. I anticipate that patient will need SNF transfer. 3.  Based upon my assessment today, patient LACKS the capacity to make an informed decision regarding SNF transfer or discharge AMA. He failed to demonstrate a specific understanding of his diagnosis, what is being recommended to address it, or the realistic potential consequences of rejecting this recommendation. I believe that his failure to understand the above most likely reflects executive dysfunction due to an underlying vascular dementia given his CT findings. 4.  Long term a formal, in depth cognitive assessment is recommended, but best completed on an outpatient basis. This would entail neurology/neuropsychology assessments. Spent > 80 minutes face to face with patient of which >50% was spent counseling and providing education regarding diagnosis, treatment options, and prognosis. Thank you for consult. Will sign off at this time. Please do not hesitate to contact provider if there are additional questions regarding patient.     Jhoan Wheeler MD  Staff Psychiatrist

## 2020-10-14 NOTE — PROGRESS NOTES
Brief progress note. Patient seen by nocturnist today. See H&P.      80year old male with Hypertension, hypothyroidism, DM type 2 presents with generalized weakness, multiple falls. 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 Assessment/Plan    # CHANELL on CKD stage 3  - Creatinine 1.6. previously 1.  - Give IVFs. Monitor labs    # Subacute on chronic SAH  - likely related to fall a week ago or so per radiology  - also had fall yesterday. - patient's daughter/POA does not wish for any intervention or workup. They are aware we do not have neuro here. - Spiritual consult    # Hypokalemia   # Hypomagnesemia  - replace electrolytes. Monitor labs. # UTI  - Treat with Rocephin. - urine cx pending. # Bradycardia  - monitor on tele. - replace electrolytes  - EKG unchanged except with more pronounced T wave flattening. # FTT in adult  - weakness. Concern about inability to take his medications appropriately. Functional decline  - Dietician consult    # Multiple falls  - fall risk  - PT/OT    # Possible early dementia  - [place]-psych consult. # Hypothyroidism  - home dose of synthroid 150 mcg   - TSH greater than 100. Unclear if due to non-compliance vs insufficient dose    # CAD  - hold aspirin. # DM Type 2  - monitor glucose. - check HgbA1C. Hold oral Rx.  - low dose SSI coverage. # Tobacco Dependence  - Recommended cessation    DVT Prophylaxis: [facility])  Diet: carb control. Code Status: DNR-CC, designated in ED and [facility] DNR form signed by Dr. Dena Larry. PT/OT Eval Status: Ordered  Dispo - Admit to inpatient 2W w/ tele.      [facility] Board FNP-C  10/14/2020

## 2020-10-14 NOTE — PROGRESS NOTES
Comprehensive Nutrition Assessment    Type and Reason for Visit:  Initial, Consult(poor po)    Nutrition Recommendations/Plan:   1. Continue CCC-4 Diet  2. Add Glucerna Strawberry to all all meals    Nutrition Assessment:    Pt. severely malnourished AEB severe loss of muscle and fat which 30# loss in year r/t to inadequate intake d/t patient is unable to a provide sufficient meals for himself . At risk for further nutritional compromise r/t physical debility form weakness . Will add Glucerna meals. Malnutrition Assessment:  Malnutrition Status:  Severe malnutrition    Context:  Chronic Illness     Findings of the 6 clinical characteristics of malnutrition:  Energy Intake:  7 - 75% or less estimated energy requirements for 1 month or longer  Weight Loss:  7 - 10% over 6 months     Body Fat Loss:  7 - Severe body fat loss Orbital, Triceps   Muscle Mass Loss:  7 - Severe muscle mass loss Temples (temporalis), Clavicles (pectoralis & deltoids), Thigh (quadraceps), Calf (gastrocnemius), Hand (interosseous), Scapula (trapezius)  Fluid Accumulation:  No significant fluid accumulation Extremities   Strength:  Not Performed    Estimated Daily Nutrient Needs:  Energy (kcal):  2751-2779 based ~ 30-35 kcal /kg cbw; Weight Used for Energy Requirements:  Current     Protein (g):   based on 1.4-1.6 gr/kg cbw; Weight Used for Protein Requirements:  Current        Fluid (ml/day):  2000-95707;  Weight Used for Fluid Requirements:  Current      Nutrition Related Findings:  eldlery male sitting up in bed; appeasr frail; states he used to weigh 175# 1 year ao  when [facility] d[facility]t he could not afford livingthere and has been lving alone the last year and can not maintain his ADL's which included not making melas and not eating eanought resultin in weight loss      Wounds:  Open Wounds(open wound right side of nose)       Current Nutrition Therapies:    DIET CARB CONTROL; Carb Control: 4 carb choices (60 gms)/meal    Anthropometric Measures:  · Height: 6' (182.9 cm)  · Current Body Weight: 145 lb (65.8 kg)   · Usual Body Weight: 150 lb (68 kg)     · Ideal Body Weight: 178 lbs; % Ideal Body Weight 81.5 %   · BMI: 19.7  · BMI Categories: Underweight (BMI less than 22) age over 72       Nutrition Diagnosis:   · Severe malnutrition related to renal dysfunction, inadequate protein-energy intake, psychological cause or life stress as evidenced by weight loss greater than or equal to 20% in 1 year, severe muscle loss, severe loss of subcutaneous fat, poor intake prior to admission, BMI, wounds      Nutrition Interventions:   Food and/or Nutrient Delivery:  Continue Current Diet, Start Oral Nutrition Supplement  Coordination of Nutrition Care:  Continued Inpatient Monitoring, Coordination of Community Care    Goals:  pt will continue to accept and consume > 75% of meals and supps       Nutrition Monitoring and Evaluation:     Food/Nutrient Intake Outcomes:  Food and Nutrient Intake, Supplement Intake  Physical Signs/Symptoms Outcomes:  Nutrition Focused Physical Findings, Weight, Biochemical Data     Discharge Planning:     Too soon to determine     Electronically signed by Noni Anand RD, LD on 10/14/20 at 4:25 PM EDT    Contact: [phone]

## 2020-10-14 NOTE — ED PROVIDER NOTES
CHIEF COMPLAINT  Fall and Other (feels weak.)      HISTORY OF PRESENT ILLNESS  Sheila Recinos is a 80 y.o. male presents to the ED with fall, he states he was taking out the trash and stumbled and fell, reports he takes vitamins, metformin, and water pill, but has not been taking his Synthroid for about a week, he lives at home, he could not get up so he called his life alert button, denies alcohol or drug use, no abdominal pain/nausea/vomiting/diarrhea, no bowel or bladder incontinence, no numbness or weakness, states sometimes he feels like his bladder gets full and has trouble making it to the bathroom in time, but denies any prostate problems, this is a chronic issue, states he had his flu shot about 2 weeks ago, thought he was doing okay initially, but has been feeling tired, with generalized weakness like his legs will not work as well today, no headache or neck stiffness/neck pain, this was an unwitnessed fall, patient denies loss of consciousness or head injury, but denies any joint pain or injury that he is aware of, he is a smoker, no chest pain or shortness of breath, no fevers, no cough or coronavirus exposure that he is aware of, patient denies any vision changes or hearing changes, no focal numbness or weakness, no speech changes or facial droop that he is aware of, no other complaints, modifying factors or associated symptoms. I have reviewed the following from the nursing documentation. Past Medical History:   Diagnosis Date    Diabetes mellitus ([address]     Hypertension     Thyroid disease      Past Surgical History:   Procedure Laterality Date    EYE SURGERY      cataract    HERNIA REPAIR       No family history on file. Social History     Socioeconomic History    Marital status:       Spouse name: Not on file    Number of children: Not on file    Years of education: Not on file    Highest education level: Not on file   Occupational History    Not on file   Social Needs    Financial resource strain: Not on file    Food insecurity     Worry: Not on file     Inability: Not on file    Transportation needs     Medical: Not on file     Non-medical: Not on file   Tobacco Use    Smoking status: Current Every Day Smoker     Packs/day: 0.50   Substance and Sexual Activity    Alcohol use: No    Drug use: No    Sexual activity: Not on file   Lifestyle    Physical activity     Days per week: Not on file     Minutes per session: Not on file    Stress: Not on file   Relationships    Social connections     Talks on phone: Not on file     Gets together: Not on file     Attends [employer] service: Not on file     Active member of club or organization: Not on file     Attends meetings of clubs or organizations: Not on file     Relationship status: Not on file    Intimate partner violence     Fear of current or ex partner: Not on file     Emotionally abused: Not on file     Physically abused: Not on file     Forced sexual activity: Not on file   Other Topics Concern    Not on file   Social History Narrative    Not on file     Current Facility-Administered Medications   Medication Dose Route Frequency Provider Last Rate Last Dose    cefTRIAXone (ROCEPHIN) 1 g IVPB in 50 mL D5W minibag  1 g Intravenous Once Lemon Pilling, DO         Current Outpatient Medications   Medication Sig Dispense Refill    levothyroxine (SYNTHROID) 150 MCG tablet Take 150 mcg by mouth      Acetaminophen 325 MG CAPS Take 975 mg by mouth      metFORMIN (GLUCOPHAGE) 850 MG tablet Take 850 mg by mouth      traMADol (ULTRAM) 50 MG tablet Take 50 mg by mouth every 6 hours as needed for Pain. Demetrius Veronica Misc. Devices (WALKER) MISC 1 each by Does not apply route daily Dispense and Fit for injury to lower extremity and weakness. 1 each 0     Allergies   Allergen Reactions    Statins        REVIEW OF SYSTEMS  10 systems reviewed, pertinent positives per HPI otherwise noted to be negative.     PHYSICAL EXAM  /82 Pulse 60   Temp 98.4 °F (36.9 °C) (Oral)   Resp 14   Ht 6' (1.829 m)   Wt 170 lb (77.1 kg)   SpO2 99%   BMI 23.06 kg/m²   GENERAL APPEARANCE: Awake and alert. Cooperative. No acute distress, appears fatigued  HEAD: Normocephalic. Atraumatic. EYES: PERRL. EOM's grossly intact. Arcus senilis  ENT: Mucous membranes are tacky, no stridor, airway patent, no otorrhea or rhinorrhea, no epistaxis, very hard of hearing, hearing aid in place, nose with deformity, erythematous ulcerative appearing lesion along the right nare, appears to have scarring as well, forehead with raised skin lesion approximately 1 cm, dry and scaly  NECK: Supple. No rigidity, no midline tenderness, thoracic kyphosis with cervical lordosis, normal range of motion   HEART: RRR. No murmurs  LUNGS: Respirations unlabored. Lungs are clear to auscultation bilaterally, poor inspiratory depth. ABDOMEN: Soft. Non-distended. Non-tender. No guarding or rebound. Normal bowel sounds. EXTREMITIES: No peripheral edema. No calf tenderness, moves all extremities equally. All extremities neurovascularly intact. SKIN: Warm and dry. No acute rashes. NEUROLOGICAL: Alert and oriented. No gross facial drooping. Strength 5/5, sensation intact. No truncal ataxia. Normal speech,  PSYCHIATRIC: Normal mood and flat affect. LABS  I have reviewed all labs for this visit. Results for orders placed or performed during the hospital encounter of 10/13/20   CBC Auto Differential   Result Value Ref Range    WBC 8.3 4.0 - 11.0 K/uL    RBC 4.20 4. 20 - 5.90 M/uL    Hemoglobin 13.9 13.5 - 17.5 g/dL    Hematocrit 39.8 (L) 40.5 - 52.5 %    MCV 94.7 80.0 - 100.0 fL    MCH 33.1 26.0 - 34.0 pg    MCHC 34.9 31.0 - 36.0 g/dL    RDW 16.1 (H) 12.4 - 15.4 %    Platelets 892 770 - 535 K/uL    MPV 8.0 5.0 - 10.5 fL    Neutrophils % 47.4 %    Lymphocytes % 44.3 %    Monocytes % 5.9 %    Eosinophils % 0.8 %    Basophils % 1.6 %    Neutrophils Absolute 4.0 1.7 - 7.7 K/uL Lymphocytes Absolute 3.7 1.0 - 5.1 K/uL    Monocytes Absolute 0.5 0.0 - 1.3 K/uL    Eosinophils Absolute 0.1 0.0 - 0.6 K/uL    Basophils Absolute 0.1 0.0 - 0.2 K/uL   Lipase   Result Value Ref Range    Lipase 49.0 13.0 - 60.0 U/L   Troponin   Result Value Ref Range    Troponin <0.01 <0.01 ng/mL   Brain Natriuretic Peptide   Result Value Ref Range    Pro- 0 - 449 pg/mL   TSH with Reflex   Result Value Ref Range    .00 (H) 0.27 - 4.20 uIU/mL   Urinalysis Reflex to Culture    Specimen: Urine, clean catch   Result Value Ref Range    Color, UA Yellow Straw/Yellow    Clarity, UA CLOUDY (A) Clear    Glucose, Ur Negative Negative mg/dL    Bilirubin Urine Negative Negative    Ketones, Urine Negative Negative mg/dL    Specific Gravity, UA 1.020 1.005 - 1.030    Blood, Urine TRACE-INTACT (A) Negative    pH, UA 6.0 5.0 - 8.0    Protein, UA 30 (A) Negative mg/dL    Urobilinogen, Urine 0.2 <2.0 E.U./dL    Nitrite, Urine Negative Negative    Leukocyte Esterase, Urine LARGE (A) Negative    Microscopic Examination YES     Urine Type NotGiven     Urine Reflex to Culture Yes    Comprehensive Metabolic Panel   Result Value Ref Range    Sodium 135 (L) 136 - 145 mmol/L    Potassium 2.9 (LL) 3.5 - 5.1 mmol/L    Chloride 95 (L) 99 - 110 mmol/L    CO2 28 21 - 32 mmol/L    Anion Gap 12 3 - 16    Glucose 81 70 - 99 mg/dL    BUN 23 (H) 7 - 20 mg/dL    CREATININE 1.6 (H) 0.8 - 1.3 mg/dL    GFR Non- 41 (A) >60    GFR  50 (A) >60    Calcium 10.1 8.3 - 10.6 mg/dL    Total Protein 6.7 6.4 - 8.2 g/dL    Alb 4.3 3.4 - 5.0 g/dL    Albumin/Globulin Ratio 1.8 1.1 - 2.2    Total Bilirubin 0.5 0.0 - 1.0 mg/dL    Alkaline Phosphatase 55 40 - 129 U/L    ALT 23 10 - 40 U/L    AST 46 (H) 15 - 37 U/L    Globulin 2.4 g/dL   Magnesium   Result Value Ref Range    Magnesium 1.40 (L) 1.80 - 2.40 mg/dL   Ethanol   Result Value Ref Range    Ethanol Lvl None Detected mg/dL   Urine Drug Screen   Result Value Ref Range Amphetamine Screen, Urine Neg Negative <1000ng/mL    Barbiturate Screen, Ur Neg Negative <200 ng/mL    Benzodiazepine Screen, Urine Neg Negative <200 ng/mL    Cannabinoid Scrn, Ur Neg Negative <50 ng/mL    Cocaine Metabolite Screen, Urine Neg Negative <300 ng/mL    Opiate Scrn, Ur Neg Negative <300 ng/mL    PCP Screen, Urine Neg Negative <25 ng/mL    Methadone Screen, Urine Neg Negative <300 ng/mL    Propoxyphene Scrn, Ur Neg Negative <300 ng/mL    Oxycodone Urine Neg Negative <100 ng/ml    pH, UA 6.0     Drug Screen Comment: see below    Microscopic Urinalysis   Result Value Ref Range    WBC, UA  (A) 0 - 5 /HPF    RBC, UA 0-2 0 - 4 /HPF    Bacteria, UA 4+ (A) None Seen /HPF   EKG 12 Lead   Result Value Ref Range    Ventricular Rate 62 BPM    Atrial Rate 62 BPM    P-R Interval 180 ms    QRS Duration 106 ms    Q-T Interval 450 ms    QTc Calculation (Bazett) 456 ms    P Axis 33 degrees    R Axis 52 degrees    T Axis 47 degrees    Diagnosis       Normal sinus rhythmSeptal infarct , age undeterminedAbnormal ECGNo previous ECGs available       RADIOLOGY  XR CHEST PORTABLE (Final result)   Result time 10/13/20 23:24:51   Final result by Katherine Anderson MD (10/13/20 23:24:51)                 Impression:     No acute disease. CT HEAD WO CONTRAST (Final result)   Result time 10/14/20 00:20:33   Final result by Katherine Anderson MD (10/14/20 00:20:33)                 Impression:     1. No acute hemorrhage. 2. Peripheral increased attenuation of bilateral chronic subdural collections   probably represents subacute subdural hemorrhage. Critical results were called by Dr. Attila Davila MD to Mary Sims on   10/14/2020 at 00:20. CT CERVICAL SPINE WO CONTRAST (Final result)   Result time 10/14/20 00:23:58   Final result by Ciara Solo DO (10/14/20 00:23:58)                 Impression:     1.  No evidence of acute fracture or traumatic malalignment involving the   cervical TSH with Reflex    Urinalysis Reflex to Culture    Comprehensive Metabolic Panel    Magnesium    Blood Gas, Venous    T4, Free    Ethanol    Urine Drug Screen    T3    Microscopic Urinalysis    Inpatient consult to Hospitalist    EKG 12 Lead     Orders Placed This Encounter   Medications    levothyroxine (SYNTHROID) tablet 150 mcg    0.9 % sodium chloride bolus    magnesium sulfate 2 g in 50 mL IVPB premix    potassium bicarb-citric acid (EFFER-K) effervescent tablet 60 mEq    cefTRIAXone (ROCEPHIN) 1 g IVPB in 50 mL D5W minibag     Order Specific Question:   Antimicrobial Indications     Answer:   Urinary Tract Infection     ED Course as of Oct 14 0231   Tue Oct 13, 2020   2222 Spoke to the patient's daughter, updated her on plan, she states their  would be able to come pick him up if he is discharged, explained we would update her if patient requires admission.    [SY]   2231 Patient reported he had not taken his Synthroid for about a week. [SY]   7248 EKG interpretation by me: Normal sinus rhythm, rate 62, QTc 456, possible old septal infarct, no ST segment or T wave changes indicative of acute ischemia   EKG 12 Lead [SY]   2359 Spoke to the daughter who is POA, he stated he is DNR CC, she would like us to treat the electrolyte disturbances, dehydration, and any infections, but wants no intubation or CPR. [SY]   Wed Oct 14, 2020   0027 I spoke to Dr. Macario De Los Santos about the subdural hematomas noted on CT scan, patient not complaining of headache and denied head injury today, but radiology reported this was likely 1 to 2 weeks ago. [SY]      ED Course User Index  [SY] Truong Pruett DO     The total critical care time spent while evaluating and treating this patient was at least 31 minutes. This excludes time spent doing separately billable procedures.   This includes time at the bedside, data interpretation, medication management, obtaining critical history from collateral sources if the patient is unable to provide it directly, and physician consultation. Specifics of interventions taken and potentially life-threatening diagnostic considerations are listed above in the medical decision making. CLINICAL IMPRESSION  1. Dehydration    2. CHANELL (acute kidney injury) ([facility] [address]    3. Hypokalemia    4. Hypomagnesemia    5. Hypothyroidism, unspecified type    6. DNR (do not resuscitate)    7. Acute UTI    8. Frequent falls    9. Subdural hematoma (HCC)    10. Generalized weakness        Blood pressure 137/82, pulse 60, temperature 98.4 °F (36.9 °C), temperature source Oral, resp. rate 14, height 6' (1.829 m), weight 170 lb (77.1 kg), SpO2 99 %. DISPOSITION  Bailey Encarnacion was admitted in stable condition.                    Erika Hair DO  10/14/20 0345

## 2020-10-14 NOTE — PROGRESS NOTES
Inpatient Physical Therapy Evaluation and Treatment    Unit: 2 [place]  Date:  10/14/2020  Patient Name:    Scott Brown  Admitting diagnosis:  CHANELL (acute kidney injury) ([facility] [address] [N17.9]  Admit Date:  10/13/2020  Precautions/Restrictions/WB Status/ Lines/ Wounds/ Oxygen: Fall risk, Bed/chair alarm, Lines -IV and [place] (hard of hearing)    Treatment Time: [id number]  Treatment Number:  1   Timed Code Treatment Minutes: 33 minutes, 10 minute eval, 23 minutes treatment  Total Treatment Minutes:  33  Minutes, 10 minute eval, 23 minute treatment    Patient Goals for Therapy: \" To go home \"          Discharge Recommendations: SNF  DME needs for discharge: defer to facility       Therapy recommendation for EMS Transport: can transport by wheelchair    Therapy recommendations for staff:   Assist of 1 with use of rolling walker (RW) for all ambulation to/from [facility]  within room    History of Present Illness: The patient is a 80 y.o. male with PMH below, presents with generalized weakness, multiple falls. Pt reportedly fell yesterday and antother time about a week ago. Pt lives alone and has a Life Alert type system. He reports he was taking out the trash when stumbled and fell. He could not get up so he summoned EMS via his life alert system. He reports that he falls \"now and then. \"  He is supposed to be using a walker at home. It is not clear if he actually does. He does note that he has been feeling weak     Per Dr. Elvira Wilcox (ED), who spoke w/ pt's daughter/POA over the phone who expressed concern about pt's multiple falls, weakness, inability to take his Rx appropriately/reliably and functional decline. They also discussed pt's imaging finding of subacute on chronic SAH. They do not wish for any intervention or w/u. They requested pt be admitted here. They are reportedly aware that we do not have NS or neuro here. Following her d/w the pt and his daughter, Dr. Elvira Wilcox designated the pt as [facility] and signed an [place] DNR form. Home Health S4 Level Recommendation:  NA  AM-PAC Mobility Score    AM-PAC Inpatient Mobility Raw Score : 17       Preadmission Environment    Pt. Lives Alone  Home environment:  apartment   Steps to enter first floor: No steps  Steps to second floor: N/A  Bathroom: tub/shower unit, grab bars and shower chair  Equipment owned: , [facility] and [facility]    Preadmission Status:  Pt. Able to drive: No  Pt Fully independent with ADLs: No  Pt. Required assistance from family for: Bathing, Cleaning and Cooking  Pt. independent for transfers and gait and walked with Indira Bile  History of falls Yes- leading to admission in driveway, 1 other fall in the last 3 months. Tripped over feet in kitchen. Pain   No  Location: N/A  Rating: NA /10  Pain Medicine Status: Denies need    Cognition    A&O x4   Able to follow 1 step commands    Subjective  Patient lying supine in bed with no family present. Pt agreeable to this PT eval & tx. Upper Extremity ROM/Strength  Please see OT evaluation. Lower Extremity ROM / Strength   AROM WFL: Yes  ROM limitations: N/A    BLE strength impaired, but not formally assessed with MMT. R LE   Quad   4   Ant Tib  4   Hamstring 4   Iliopsoas 4  L LE  Quad   4   Ant Tib  4   Hamstring 4   Iliopsoas 4    Lower Extremity Sensation    WFL    Lower Extremity Proprioception:   WFL    Coordination and Tone  WFL    Balance  Sitting:  Fair +; CGA and Min A   Comments: at EOB, pt demos decreased balance and tolerance to sitting unsupported at the EOB. Standing: Fair ; Min A   Comments: with RW.     Bed Mobility   Supine to Sit:    Min A   Sit to Supine:   Not Tested- pt up in chair at end of session  Rolling:   Not Tested  Scooting in sitting: Min A   Scooting in supine:  Not Tested    Transfer Training     Sit to stand:   Min A   Stand to sit:   Min A   Bed to Chair:   Min A  with use of rolling walker (RW)    Gait gait completed as indicated below  Distance:      35 ft  Deviations (firm include:    [place], decreased safety awareness, decreased awareness of deficits    Plan    To be seen 3-5 x / week  while in acute care setting for therapeutic exercises, bed mobility, transfers, progressive gait training, balance training, and family/patient education. Signature: Ottoniel Amaro, PT, DPT    If patient discharges from this facility prior to next visit, this note will serve as the Discharge Summary.

## 2020-10-14 NOTE — PROGRESS NOTES
Per Dr Nilsa Mackenzie pt's daughter is to be notified of admit in AM. D[facility] [place] but [facility]. DTR didn't want to be contacted in night unless of emergency.  Sera Perry

## 2020-10-14 NOTE — H&P
Hospital Medicine History & Physical      PCP: Sergio Rogers MD    Date of Service: Pt seen/examined on 10/14/20 and admitted on 10/14/20 to Inpatient. Chief Complaint   Patient presents with   [facility] Other     feels weak. History Of Present Illness: The patient is a 80 y.o. male with PMH below, presents with generalized weakness, multiple falls. Pt reportedly fell yesterday and antother time about a week ago. Pt lives alone and has a Life Alert type system. He reports he was taking out the trash when stumbled and fell. He could not get up so he summoned EMS via his life alert system. He reports that he falls \"now and then. \"  He is supposed to be using a walker at home. It is not clear if he actually does. He does note that he has been feeling weak    Per Dr. Cornelio Shrestha (ED), who spoke w/ pt's daughter/POA over the phone who expressed concern about pt's multiple falls, weakness, inability to take his Rx appropriately/reliably and functional decline. They also discussed pt's imaging finding of subacute on chronic SAH. They do not wish for any intervention or w/u. They requested pt be admitted here. They are reportedly aware that we do not have NS or neuro here. Following her d/w the pt and his daughter, Dr. Cornelio Shrestha designated the pt as [facility] and signed an [place] DNR form. Past Medical History:        Diagnosis Date    Diabetes mellitus ([address]     Hypertension     Thyroid disease        Past Surgical History:        Procedure Laterality Date    EYE SURGERY      cataract    HERNIA REPAIR         Medications Prior to Admission:    Prior to Admission medications    Medication Sig Start Date End Date Taking?  Authorizing Provider   levothyroxine (SYNTHROID) 150 MCG tablet Take 150 mcg by mouth   Yes Historical Provider, MD   Acetaminophen 325 MG CAPS Take 975 mg by mouth 10/22/18   Historical Provider, MD   metFORMIN (GLUCOPHAGE) 850 MG tablet Take 850 mg by mouth    Historical Provider, MD   traMADol (ULTRAM) 50 MG tablet Take 50 mg by mouth every 6 hours as needed for Pain. Nae Taylor Historical Provider, MD   Misc. Devices (WALKER) MISC 1 each by Does not apply route daily Dispense and Fit for injury to lower extremity and weakness. 11/6/16   Leo Patterson, APRN - CNP       Allergies:  Statins    Social History:    TOBACCO:   reports that he has been smoking. He has been smoking about 0.50 packs per day. He does not have any smokeless tobacco history on file. ETOH:   reports no history of alcohol use. Family History:  Reviewed in detail and negative for DM, Early CAD, Cancer (except as below). Positive as follows:    History reviewed. No pertinent family history. REVIEW OF SYSTEMS:   Pertinent positives/negatives as follows: generalized weakness, multiple falls, and as discussed in HPI, otherwise a complete ROS performed and all other systems are negative  PHYSICAL EXAM PERFORMED:    /82   Pulse 60   Temp 98.4 °F (36.9 °C) (Oral)   Resp 14   Ht 6' (1.829 m)   Wt 170 lb (77.1 kg)   SpO2 99%   BMI 23.06 kg/m²     GEN:  A&Ox3, NAD. Appears frail. HEENT:  NC/AT,EOMI, dry MM, no erythema/exudates or visible masses. CVS:  Normal S1,S2. RRR. Without M/G/R.   LUNG:   CTA-B. no wheezes, rales or rhonchi  ABD:  Soft, ND/NT, BS+ x4. Without G/R.  EXT: 2+ pulses, no c/c/e. Brisk cap refill. PSY:  Thought process intact, affect appropriate. Lacks insight into functional status at this time. TONA:  CN III-XII intact, moves all 4 spontaneously, sensory grossly intact. Follows simple commands but content of answers to some questions is limited. SKIN: No rash or lesions on visible skin.     Chart review shows recent radiographs:  Ct Head Wo Contrast    Result Date: 10/14/2020  EXAMINATION: CT OF THE HEAD WITHOUT CONTRAST  10/13/2020 11:32 pm TECHNIQUE: CT of the head was performed without the administration of intravenous contrast. Dose modulation, iterative reconstruction, and/or weight based adjustment of the mA/kV was utilized to reduce the radiation dose to as low as reasonably achievable. COMPARISON: 10/29/2018 HISTORY: ORDERING SYSTEM PROVIDED HISTORY: fall, generalized weakness TECHNOLOGIST PROVIDED HISTORY: Has a \"code stroke\" or \"stroke alert\" been called? ->No Reason for exam:->fall, generalized weakness Reason for Exam: fall, generalized weakness; fall, LOC, neck pain Acuity: Acute Type of Exam: Initial FINDINGS: BRAIN/VENTRICLES: There is no acute intracranial hemorrhage. Again noted are bilateral subdural effusions/hygromas. There is now subtle increased density, just greater than CSF, within the periphery of both collections. Both collections are smaller compared to the prior exam.  There is mild patchy periventricular and subcortical white matter low attenuation without mass effect or edema. There is no hydrocephalus or midline shift. ORBITS: The visualized portion of the orbits demonstrate no acute abnormality. SINUSES: The visualized paranasal sinuses and mastoid air cells demonstrate no acute abnormality. SOFT TISSUES/SKULL:  No acute abnormality of the visualized skull or soft tissues. Tiny metallic density is again seen in the subcutaneous fat overlying the medial left frontal bone. 1. No acute hemorrhage. 2. Peripheral increased attenuation of bilateral chronic subdural collections probably represents subacute subdural hemorrhage. Critical results were called by Dr. Gretel Montgomery MD to Sowmya Garay on 10/14/2020 at 00:20. Ct Cervical Spine Wo Contrast    Result Date: 10/14/2020  EXAMINATION: CT OF THE CERVICAL SPINE WITHOUT CONTRAST 10/13/2020 11:32 pm TECHNIQUE: CT of the cervical spine was performed without the administration of intravenous contrast. Multiplanar reformatted images are provided for review.  Dose modulation, iterative reconstruction, and/or weight based adjustment of the mA/kV was utilized to reduce the radiation dose to as low as reasonably achievable. COMPARISON: CTA of the neck dated October 29, 2018 HISTORY: ORDERING SYSTEM PROVIDED HISTORY: fall, generalized weakness TECHNOLOGIST PROVIDED HISTORY: Reason for exam:->fall, generalized weakness Reason for Exam: fall, generalized weakness Acuity: Acute Type of Exam: Initial FINDINGS: BONES/ALIGNMENT: There is no acute fracture or traumatic malalignment. DEGENERATIVE CHANGES: Mild degenerative changes seen throughout the cervical spine. Facet arthropathy seen throughout the cervical region. SOFT TISSUES: There is no prevertebral soft tissue swelling. Fibrotic changes seen within the lung apices bilaterally. 6 mm nodule is seen within the right apex. 1. No evidence of acute fracture or traumatic malalignment involving the cervical spine 2. 6 mm nodule, right apex, unchanged. Nodule is stable for 2 years. RECOMMENDATIONS: Fleischner Society guidelines for follow-up and management of incidentally detected pulmonary nodules: Single Solid Nodule: Nodule size equals 6-8 mm: In a low-risk patient, CT at 6-12 months, then consider CT at 18-24 months. - Low risk patients include individuals with minimal or absent history of smoking and other known risk factors. - High risk patients include individuals with a history or smoking or known risk factors. Reference: Radiology 2017 http://pubs. rsna.org/doi/full/10.1148/radiol. 2503771508     Xr Chest Portable    Result Date: 10/13/2020  EXAMINATION: ONE XRAY VIEW OF THE CHEST 10/13/2020 10:58 pm COMPARISON: 10/29/2018 HISTORY: ORDERING SYSTEM PROVIDED HISTORY: generalized weakness, fall TECHNOLOGIST PROVIDED HISTORY: Reason for exam:->generalized weakness, fall Reason for Exam: generalized weakness, fall Acuity: Acute Type of Exam: Initial FINDINGS: Minimal left basilar scarring is again noted without acute airspace opacity. The cardiomediastinal silhouette is normal.  The costophrenic angles are sharp. There is no discernible pneumothorax.   No fracture is identified. No acute disease. EKG 12 Lead [[id number]]      Collected: 10/13/20 2231     Updated: 10/13/20 2238      Ventricular Rate  62  BPM     Atrial Rate  62  BPM     P-R Interval  180  ms     QRS Duration  106  ms     Q-T Interval  450  ms     QTc Calculation (Bazett)  456  ms     P Axis  33  degrees     R Axis  52  degrees     T Axis  47  degrees     Diagnosis  Normal sinus rhythmSeptal infarct , age undeterminedAbnormal ECGNo previous ECGs available    Appears similar to previous from 10/29/18 except for diffuse T wave flattening more pronounced. Septal depressions appear similar. CBC:  Recent Labs     10/13/20  2255   WBC 8.3   HGB 13.9   HCT 39.8*           RENAL  Recent Labs     10/13/20  2255   *   K 2.9*   CL 95*   CO2 28   BUN 23*   CREATININE 1.6*   GLUCOSE 81       Hemoglobin a1c:  Added on. LFT'S:  Recent Labs     10/13/20  2255   AST 46*   ALT 23   BILITOT 0.5   ALKPHOS 55     CARDIAC ENZYMES:   Recent Labs     10/13/20  2255   TROPONINI <0.01     Lab Results   Component Value Date    PROBNP 204 10/13/2020    PROBNP 73 10/29/2018     U/A:  Recent Labs     10/14/20  0119   LEUKOCYTESUR LARGE*   BACTERIA 4+*   WBCUA *   COLORU Yellow   RBCUA 0-2   CLARITYU CLOUDY*   SPECGRAV 1.020   BLOODU TRACE-INTACT*   GLUCOSEU Negative       Urine Tox Screen:  Recent Labs     10/14/20  0119   LABAMPH Neg   BARBSCNU Neg   LABBENZ Neg   CANSU Neg   COCAIMETSCRU Neg   OPIATESCREENURINE Neg   PHENCYCLIDINESCREENURINE Neg   LABMETH Neg   PROPOX Neg   PHUR 6.0  6.0   OXYCODONEUR Neg     PHYSICIAN CERTIFICATION  I certify that Sheila Recinos is expected to be hospitalized for 2 midnights based on the following assessment and plan:    ASSESSMENT/PLAN:  1. CHANELL on probable CKD, Cr 1.6, only 1 prev value of 1.0 in 10/2018 in EMR. IVF and repeat labs in am.  2. 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 Fall today as well.   Per Dr. Bro Hutchison, who spoke with pt's daughter/POA over phone. They do not wish for any intervention or W/u. They requested pt be admitted here. They are reportedly aware that we do not have NS or neuro here. Spiritual c/s. 3. Hypokalemia, 2.9, 60 meq ordered in ED. Replacement protocol. 4. Hypomagnesemia, 1.4, 2g Mg SO4 ordered in ED. Replacement protocol. 5. UTI, f/u cx, IV ceftriaxone. 6. Bradycardia, 50-60's.  ? Contributing to falls. Tele. Correct elecs and monitor for now. Defer to DD for cards c/s if desired. EKG unchanged except for more pronounced diffuse T-wave flattening which may be related to elecs being off.    7. FTT in adult w/ multiple falls. Possible early dementia. Per Dr. China Goodman, pt's daughter expressed concern about pt's multiple falls, weakness, inability to take his Rx appropriately/reliably and functional decline. PT/OT, dietitian and CM management c/s's. Fall precautions. Will request [facility] eval but they may not be able to assist until after medical issues addressed. Pt appears to have little insight and may be resistant to placement. He lives alone. He is supposed to use a walker at home, not clear if he is actually using. Hold home Ultram.   8. Hypothyroidism, continue home levothyroxine, TSH > 100. Non-compliance vs insufficient dose. F/u studies ordered. 9. CAD, hold ASA for now. 10. DM2, BGT 81, hold oral Rx, chk A1c, add Low SSI. 11. Tobacco Abuse, counseled cessation, 14 mg nicotine patch. DVT Prophylaxis: [facility])  Diet: carb control. Code Status: DNR-CC, designated in ED and [facility] DNR form signed by Dr. China Goodman. PT/OT Eval Status: Will order if needed and as patient condition allows  Dispo - Admit to inpatient 2W w/ tele. Jameson Coelho MD    Thank you Macario Headley MD for the opportunity to be involved in this patient's care.  If you have any questions or concerns please feel free to contact me via the [employer] at [phone]. This chart was generated using the [address] [place] [address] dictation system. I created this record but it may contain dictation errors given the limitations of this technology.

## 2020-10-14 NOTE — PROGRESS NOTES
Inpatient Occupational Therapy  Evaluation and Treatment    Unit: 2 [place]  Date:  10/14/2020  Patient Name:    Royal Westfall  Admitting diagnosis:  CHANELL (acute kidney injury) [facility]) [N17.9]  Admit Date:  10/13/2020  Precautions/Restrictions/WB Status/ Lines/ Wounds/ Oxygen: fall risk, IV, bed/chair alarm and [place] (hard of hearing)    Treatment Time:  4136-3732  Treatment Number: 1     Billable Treatment Time: 23 minutes   Total Treatment Time:   33   minutes    Patient Goals for Therapy:  \" to go home \"      Discharge Recommendations: SNF  DME needs for discharge: defer to facility       Therapy recommendations for staff:   Assist of 1 with use of rolling walker (RW) for all transfers to/from BSC/chair    History of Present Illness: per H&P 80 y.o. male with PMH below, presents with generalized weakness, multiple falls. Pt reportedly fell yesterday and antother time about a week ago. Pt lives alone and has a Life Alert type system. He reports he was taking out the trash when stumbled and fell. He could not get up so he summoned EMS via his life alert system. He reports that he falls \"now and then. \"  He is supposed to be using a walker at home. It is not clear if he actually does. He does note that he has been feeling weak     Per Dr. Adam Buchanan (ED), who spoke w/ pt's daughter/POA over the phone who expressed concern about pt's multiple falls, weakness, inability to take his Rx appropriately/reliably and functional decline. They also discussed pt's imaging finding of subacute on chronic SAH. They do not wish for any intervention or w/u. They requested pt be admitted here. They are reportedly aware that we do not have NS or neuro here. Following her d/w the pt and his daughter, Dr. Adam Buchanan designated the pt as [facility] and signed an [place] DNR form. Home Health S4 Level Recommendation:  NA  AM-PAC Score: AM-PAC Inpatient Daily Activity Raw Score: 18    Preadmission Environment    Pt.  Lives Alone  Home environment: apartment   Steps to enter first floor: No steps  Steps to second floor:         N/A  Bathroom: tub/shower unit, grab bars and shower chair  Equipment owned: , [facility] and [facility]     Preadmission Status:  Pt. Able to drive: No  Pt Fully independent with ADLs: No  Pt. Required assistance from family for: Bathing, Cleaning and Cooking  Pt. independent for transfers and gait and walked with Sarah Renteria  History of falls          Yes- leading to admission in driveway, 1 other fall in the last 3 months. Tripped over feet in kitchen.     Pain   No  Location: N/A  Rating: NA /10  Pain Medicine Status: Denies need     Cognition    A&O x4   Able to follow 1 step commands     Subjective  Patient lying supine in bed with no family present. Pt agreeable to this OT eval & tx. Upper Extremity ROM:    WFL,  pt able to perform all bed mobility, transfers, and gait without ROM limitation. Upper Extremity Strength:    BUE strength impaired but not formally assessed w/ MMT    Upper Extremity Sensation    WFL    Upper Extremity Proprioception:  WFL    Coordination and Tone  WFL    Balance  Functional Sitting Balance:  Impaired CGA  Functional Standing Balance:Impaired min assist with RW    Bed mobility:    Supine to sit:   Min A  Sit to supine:   Not Tested  Rolling:    Not Tested  Scooting in sitting:  Min A  Scooting to head of bed:   Not Tested    Bridging:   Not Tested    Transfers:    Sit to stand:  Min A  Stand to sit:  Min A  Bed to chair:   Min A  Standard toilet: Not Tested  Bed to [facility]:  Not Tested    Dressing:      UE:   Not Tested  LE:    Min A    Bathing:    UE:  Not Tested  LE:  Not Tested    Eating:   Independent    Toileting:  Not Tested    Activity Tolerance   Pt completed therapy session with No adverse symptoms noted w/activity      Positioning Needs:   Up in chair, call light and needs in reach.     Alarm Set    Exercise / Activities Initiated:   N/A    Patient/Family Education:   Role of OT  Recommendations for DC    Assessment of Deficits: Pt seen for Occupational therapy evaluation in acute care setting. Pt demonstrated decreased Activity tolerance, ADLs, IADLs, Balance , Bed mobility, Safety Awareness, Strength, Transfers and Coping Skills. Pt functioning below baseline and will likely benefit from skilled occupational therapy services to maximize safety and independence. Goal(s) : To be met in 3 Visits:  1). Bed to toilet/BSC: SBA    To be met in 5 Visits:  1). Supine to/from Sit:  SBA  2). Upper Body Bathing:   Independent  3). Lower Body Bathing:   SBA  4). Upper Body Dressing:  Independent  5). Lower Body Dressing:  SBA  6). Pt to demonstrate UE exs x 15 reps with minimal cues    Rehabilitation Potential:  Good for goals listed above. Strengths for achieving goals include: Pt motivated  Barriers to achieving goals include:  Complexity of condition     Plan: To be seen 3-5 x/wk while in acute care setting for therapeutic exercises, bed mobility, transfers, dressing, bathing, family/patient education, ADL/IADL retraining, energy conservation training.      Sabrina Brewster OTR/L [id number]            If patient discharges from this facility prior to next visit, this note will serve as the Discharge Summary

## 2020-10-14 NOTE — ED NOTES
0004- perfect serve to Dr. Emeli Pacheco for consult per Dr. Naresh Pedro. Kurt Pacheco returned page spoke with Dr. Naresh Pedro.      Patel Diane  10/14/20 0046

## 2020-10-15 LAB
ANION GAP SERPL CALCULATED.3IONS-SCNC: 12 MMOL/L (ref 3–16)
BUN BLDV-MCNC: 17 MG/DL (ref 7–20)
CALCIUM SERPL-MCNC: 9.3 MG/DL (ref 8.3–10.6)
CHLORIDE BLD-SCNC: 96 MMOL/L (ref 99–110)
CO2: 24 MMOL/L (ref 21–32)
CREAT SERPL-MCNC: 1.4 MG/DL (ref 0.8–1.3)
GFR AFRICAN AMERICAN: 58
GFR NON-AFRICAN AMERICAN: 48
GLUCOSE BLD-MCNC: 103 MG/DL (ref 70–99)
GLUCOSE BLD-MCNC: 108 MG/DL (ref 70–99)
GLUCOSE BLD-MCNC: 111 MG/DL (ref 70–99)
GLUCOSE BLD-MCNC: 118 MG/DL (ref 70–99)
GLUCOSE BLD-MCNC: 86 MG/DL (ref 70–99)
HCT VFR BLD CALC: 36.7 % (ref 40.5–52.5)
HEMOGLOBIN: 12.8 G/DL (ref 13.5–17.5)
MAGNESIUM: 1.8 MG/DL (ref 1.8–2.4)
MCH RBC QN AUTO: 32.6 PG (ref 26–34)
MCHC RBC AUTO-ENTMCNC: 34.8 G/DL (ref 31–36)
MCV RBC AUTO: 93.8 FL (ref 80–100)
ORGANISM: ABNORMAL
PDW BLD-RTO: 16 % (ref 12.4–15.4)
PERFORMED ON: ABNORMAL
PLATELET # BLD: 266 K/UL (ref 135–450)
PMV BLD AUTO: 8.2 FL (ref 5–10.5)
POTASSIUM REFLEX MAGNESIUM: 3.3 MMOL/L (ref 3.5–5.1)
RBC # BLD: 3.91 M/UL (ref 4.2–5.9)
SODIUM BLD-SCNC: 132 MMOL/L (ref 136–145)
URINE CULTURE, ROUTINE: ABNORMAL
WBC # BLD: 8.1 K/UL (ref 4–11)

## 2020-10-15 PROCEDURE — 85027 COMPLETE CBC AUTOMATED: CPT

## 2020-10-15 PROCEDURE — 97110 THERAPEUTIC EXERCISES: CPT

## 2020-10-15 PROCEDURE — G0378 HOSPITAL OBSERVATION PER HR: HCPCS

## 2020-10-15 PROCEDURE — 36415 COLL VENOUS BLD VENIPUNCTURE: CPT

## 2020-10-15 PROCEDURE — 1200000000 HC SEMI PRIVATE

## 2020-10-15 PROCEDURE — 6370000000 HC RX 637 (ALT 250 FOR IP): Performed by: INTERNAL MEDICINE

## 2020-10-15 PROCEDURE — 96367 TX/PROPH/DG ADDL SEQ IV INF: CPT

## 2020-10-15 PROCEDURE — 83735 ASSAY OF MAGNESIUM: CPT

## 2020-10-15 PROCEDURE — 99232 SBSQ HOSP IP/OBS MODERATE 35: CPT | Performed by: INTERNAL MEDICINE

## 2020-10-15 PROCEDURE — 80048 BASIC METABOLIC PNL TOTAL CA: CPT

## 2020-10-15 PROCEDURE — 97116 GAIT TRAINING THERAPY: CPT

## 2020-10-15 PROCEDURE — 2580000003 HC RX 258: Performed by: INTERNAL MEDICINE

## 2020-10-15 PROCEDURE — 2500000003 HC RX 250 WO HCPCS: Performed by: INTERNAL MEDICINE

## 2020-10-15 PROCEDURE — 6360000002 HC RX W HCPCS: Performed by: INTERNAL MEDICINE

## 2020-10-15 PROCEDURE — 97535 SELF CARE MNGMENT TRAINING: CPT

## 2020-10-15 RX ADMIN — Medication 10 ML: at 08:52

## 2020-10-15 RX ADMIN — CEFTRIAXONE SODIUM 1 G: 1 INJECTION, POWDER, FOR SOLUTION INTRAMUSCULAR; INTRAVENOUS at 02:45

## 2020-10-15 RX ADMIN — POTASSIUM CHLORIDE, DEXTROSE MONOHYDRATE AND SODIUM CHLORIDE: 150; 5; 450 INJECTION, SOLUTION INTRAVENOUS at 16:16

## 2020-10-15 RX ADMIN — POTASSIUM CHLORIDE 40 MEQ: 1500 TABLET, EXTENDED RELEASE ORAL at 08:52

## 2020-10-15 RX ADMIN — ACETAMINOPHEN 650 MG: 325 TABLET ORAL at 17:18

## 2020-10-15 RX ADMIN — LEVOTHYROXINE SODIUM 150 MCG: 150 TABLET ORAL at 05:43

## 2020-10-15 ASSESSMENT — PAIN DESCRIPTION - ONSET: ONSET: GRADUAL

## 2020-10-15 ASSESSMENT — PAIN DESCRIPTION - LOCATION: LOCATION: EAR

## 2020-10-15 ASSESSMENT — PAIN DESCRIPTION - DESCRIPTORS: DESCRIPTORS: ACHING

## 2020-10-15 ASSESSMENT — PAIN DESCRIPTION - FREQUENCY: FREQUENCY: INTERMITTENT

## 2020-10-15 ASSESSMENT — PAIN DESCRIPTION - PROGRESSION: CLINICAL_PROGRESSION: GRADUALLY WORSENING

## 2020-10-15 ASSESSMENT — PAIN DESCRIPTION - PAIN TYPE: TYPE: ACUTE PAIN

## 2020-10-15 ASSESSMENT — PAIN SCALES - GENERAL: PAINLEVEL_OUTOF10: 4

## 2020-10-15 ASSESSMENT — PAIN DESCRIPTION - ORIENTATION: ORIENTATION: RIGHT

## 2020-10-15 NOTE — PROGRESS NOTES
Occupational Therapy Daily Treatment Note    Unit: 2 [place]  Date:  10/15/2020  Patient Name:    Breanna Berrios  Admitting diagnosis:  CHANELL (acute kidney injury) [facility]) [N17.9]  Admit Date:  10/13/2020  Precautions/Restrictions:  fall risk, IV, bed/chair alarm and [place] (hard of hearing)        Discharge Recommendations: SNF  DME needs for discharge: defer to facility       Therapy recommendations for staff:   Assist of 1 (minimal assist) with use of rolling walker (RW) for all transfers to/from BSC/chair    AM-PAC Score: AM-PAC Inpatient Daily Activity Raw Score: 18  Home Health S4 Level: NA       Treatment Time:  11:43-12:07  Treatment number:  2   Total Treatment Time:   24 minutes    History of Present Illness: per H&P 80 y. o. male with PMH below, presents with generalized weakness, multiple falls.  Pt reportedly fell yesterday and antother time about a week ago.  Pt lives alone and has a Life Alert type system. Kimmy Hilario reports he was taking out the trash when stumbled and fell. Kimmy Hilario could not get up so he summoned EMS via his life alert system. Kimmy Hilario reports that he falls \"now and then. \" Kimmy Hilario is supposed to be using a walker at home. Elisabet Houston is not clear if he actually does. Kimmy Hilario does note that he has been feeling weak     Per Dr. Karla Young (ED), who spoke w/ pt's daughter/POA over the phone who expressed concern about pt's multiple falls, weakness, inability to take his Rx appropriately/reliably and functional decline. They also discussed pt's imaging finding of subacute on chronic SAH.  They do not wish for any intervention or w/u. Nyasia Diane requested pt be admitted here. Nyasia Diane are reportedly aware that we do not have NS or neuro here.  Following her d/w the pt and his daughter, Dr. Karla Young designated the pt as [facility] and signed an [place] DNR form.      Subjective:  Patient laying in bed upon therapist arrival. Agreeable to treatment. Alert and oriented x4.      Pain   No  Rating: NA  Location:  Pain Medicine Status: Denies need      Bed Mobility:   Supine to Sit:  SBA  Sit to Supine:  Not Tested  Rolling:           Not Tested  Scooting:        SBA    Transfer Training:   Sit to stand:   CGA  Stand to sit:  CGA  Bed to Chair:  CGA and with use of RW  Bed to [facility]:   Not Tested  Standard toilet:   Not Tested    Activity Tolerance   Pt completed therapy session with No adverse symptoms noted w/activity  SpO2:   HR:   BP:     ADL Training:   Upper body dressing:  Not Tested  Upper body bathing:  Not Tested  Lower body dressing:  SBA  Lower body bathing:  CGA  Toileting:   Not Tested  Grooming/Hygiene:  Not Tested    Therapeutic Exercise:   Shoulder flex/ext:  x15  Shoulder abd/add:  x15  Shoulder horizontal abd/add:  x15  Scapular retraction:  x15  shoulder elevation:  x15    Patient Education:   Role of OT  Recommendations for DC  Safe RW use/hand placement    Positioning Needs:   Up in chair, call light and needs in reach. Alarm Set   Patient eating lunch    Family Present:  No    Assessment: Patient demonstrated good tolerance for treatment session. Completed all mobility and ADLs with SB-CGA. Patient would benefit from continued skilled OT services while inpatient and at DC. GOALS  To be met in 3 Visits:  1). Bed to toilet/BSC: SBA     To be met in 5 Visits:  1). Supine to/from Sit:             SBA  2). Upper Body Bathing:         Independent  3). Lower Body Bathing:         SBA  4). Upper Body Dressing:       Independent  5). Lower Body Dressing:       SBA  6).  Pt to demonstrate UE exs x 15 reps with minimal cues    Plan: cont with [facility], OTR/L #[id number]    If patient discharges from this facility prior to next visit, this note will serve as the Discharge Summary

## 2020-10-15 NOTE — FLOWSHEET NOTE
10/15/20 0725   Vital Signs   Temp 96.7 °F (35.9 °C)   Temp Source Oral   Pulse 52   Heart Rate Source Monitor   Resp 18   BP (!) 143/90   BP Location Right upper arm   BP Upper/Lower Upper   Patient Position Semi fowlers   Level of Consciousness 0   MEWS Score 1   Oxygen Therapy   SpO2 97 %   O2 Device None (Room air)   Patient resting in bed at this time, alert and oriented, cooperative with care. Resp e/e, no s/s of distress noted. Received PRN Potassium 40 meq this morning for K+ of 3.3. Accepted medications without issues. Assessment complete, see flowsheet. Fall precautions in place, call light within reach. Received message to call daughter Wei Sánchez for update. Spoke with Wei Sánchez and provided update on pt's condition.

## 2020-10-15 NOTE — PROGRESS NOTES
Pt removed telemetry and stated to staff \"I don't want to wear this. \" Perfect serve message sent to Dr. Tesha North and received new order to d/c telemetry.

## 2020-10-15 NOTE — PROGRESS NOTES
Patient is not able to demonstrate the ability to move from a reclining position to an upright position within the recliner due to increased weakness.

## 2020-10-15 NOTE — PROGRESS NOTES
improved activity tolerance today, ambulating 60 ft + 100 ft with CGA using RW. Pt has Fair + balance and demonstrates some unsafe behaviors with RW while reaching outside GUILHERME to navigate obstacles or prepare to sit. Recommending SNF upon discharge as patient functioning well below baseline, demonstrates good rehab potential and unable to return home due to limited or no family support, home environment not conducive to patient recovery and limited safety awareness. Goals (all goals ongoing unless otherwise indicated)  To be met in 3 visits:  1). Independent with LE Ex x 10 reps - PROGRESSING 10/15     To be met in 6 visits:  1). Supine to/from sit: SBA - PROGRESSING 10/15  2). Sit to/from stand: CGA - GOAL MET 10/15  3). Bed to chair: CGA   4). Gait: Ambulate 50 ft.  with  CGA and use of rolling walker (RW) - GOAL MET 10/15, PROGRESS  ft with Supervision and RW. 5). Tolerate B LE exercises 3 sets of 10-15 reps    Plan   Continue with plan of care. Signature: Bernard Kunz, PT, DPT    If patient discharges from this facility prior to next visit, this note will serve as the Discharge Summary.

## 2020-10-15 NOTE — PLAN OF CARE
Problem: Falls - Risk of:  Goal: Will remain free from falls  Description: Will remain free from falls  Outcome: Ongoing  Goal: Absence of physical injury  Description: Absence of physical injury  Outcome: Ongoing     Problem: Infection:  Goal: Will remain free from infection  Description: Will remain free from infection  Outcome: Ongoing     Problem: Safety:  Goal: Free from accidental physical injury  Description: Free from accidental physical injury  Outcome: Ongoing  Goal: Free from intentional harm  Description: Free from intentional harm  Outcome: Ongoing     Problem: Daily Care:  Goal: Daily care needs are met  Description: Daily care needs are met  Outcome: Ongoing     Problem: Discharge Planning:  Goal: Patients continuum of care needs are met  Description: Patients continuum of care needs are met  Outcome: Ongoing     Problem: Skin Integrity:  Goal: Will show no infection signs and symptoms  Description: Will show no infection signs and symptoms  Outcome: Ongoing  Goal: Absence of new skin breakdown  Description: Absence of new skin breakdown  Outcome: Ongoing     Problem: Nutrition  Goal: Optimal nutrition therapy  Outcome: Ongoing

## 2020-10-15 NOTE — FLOWSHEET NOTE
10/15/20 1715   Vital Signs   Patient Currently in Pain Yes   Pain Assessment   Pain Assessment 0-10   Pain Level 4   Pain Type Acute pain   Pain Location Ear   Pain Orientation Right   Pain Descriptors Aching   Pain Frequency Intermittent   Pain Onset Gradual   Clinical Progression Gradually worsening   Patient voices c/o right ear pain, requested and received PRN Tylenol.

## 2020-10-15 NOTE — PROGRESS NOTES
Progress Note    Admit Date:  10/13/2020    80year old male with Hypertension, hypothyroidism, DM type 2 presents with generalized weakness, multiple falls. 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 Subjective:  Mr. Micheal Arevalo looks better today. Still weak. No other complaints     Objective:   Patient Vitals for the past 4 hrs:   BP Temp Temp src Pulse Resp SpO2   10/15/20 0725 (!) 143/90 96.7 °F (35.9 °C) Oral 52 18 97 %            Intake/Output Summary (Last 24 hours) at 10/15/2020 1025  Last data filed at 10/15/2020 0852  Gross per 24 hour   Intake 370 ml   Output 300 ml   Net 70 ml       Physical Exam:  Gen: No distress. Alert. Eyes: PERRL. No sclera icterus. No conjunctival injection. ENT: No discharge. Pharynx clear. Neck: No JVD. No Carotid Bruit. Trachea midline. Resp: No accessory muscle use. No crackles. No wheezes. No rhonchi. CV: Regular rate. Regular rhythm. No murmur. No rub. No edema. Capillary Refill: Brisk,< 3 seconds   Peripheral Pulses: +2 palpable, equal bilaterally   GI: Non-tender. Non-distended. No masses. No organomegaly. Normal bowel sounds. No hernia. Skin: Warm and dry. No nodule on exposed extremities. No rash on exposed extremities. M/S: No cyanosis. No joint deformity. No clubbing. Neuro: Awake. Grossly nonfocal    Psych: Oriented x 3.  No anxiety or agitation      Data:  CBC:   Recent Labs     10/13/20  2255 10/14/20  0503 10/15/20  0534   WBC 8.3 7.8 8.1   HGB 13.9 13.0* 12.8*   HCT 39.8* 37.3* 36.7*   MCV 94.7 94.2 93.8    248 266     BMP:   Recent Labs     10/14/20  0503 10/14/20  1611 10/15/20  0534    133* 132*   K 2.7* 3.3* 3.3*   CL 99 95* 96*   CO2 24 28 24   PHOS 1.8*  --   --    BUN 19 19 17   CREATININE 1.3 1.4* 1.4*     LIVER PROFILE:   Recent Labs     10/13/20  2255 10/14/20  0503   AST 46* 37   ALT 23 20   LIPASE 49.0  --    BILITOT 0.5 0.4 ALKPHOS 55 53     PT/INR: No results for input(s): PROTIME, INR in the last 72 hours. CULTURES  Urine: E. coli    RADIOLOGY  CT CERVICAL SPINE WO CONTRAST   Final Result   1. No evidence of acute fracture or traumatic malalignment involving the   cervical spine   2. 6 mm nodule, right apex, unchanged. Nodule is stable for 2 years. RECOMMENDATIONS:   Fleischner Society guidelines for follow-up and management of incidentally   detected pulmonary nodules:      Single Solid Nodule:      Nodule size equals 6-8 mm: In a low-risk patient, CT at 6-12 months, then   consider CT at 18-24 months. - Low risk patients include individuals with minimal or absent history of   smoking and other known risk factors. - High risk patients include individuals with a history or smoking or known   risk factors. Reference: Radiology 2017   http://pubs. rsna.org/doi/full/10.1148/radiol. 6911761631         CT HEAD WO CONTRAST   Final Result   1. No acute hemorrhage. 2. Peripheral increased attenuation of bilateral chronic subdural collections   probably represents subacute subdural hemorrhage. Critical results were called by Dr. Eleni Rock MD to Abe Nielsonns on   10/14/2020 at 00:20. XR CHEST PORTABLE   Final Result   No acute disease. Assessment/Plan:  # CHANELL on CKD stage 3  - Creatinine 1.6. previously 1.  - Give IVFs. 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 - patient's daughter/POA does not wish for any intervention or workup. They are aware we do not have neuro here. - Spiritual consult     # Hypokalemia   # Hypomagnesemia  - replace electrolytes. Monitor labs. - improving.      # UTI  - Treat with Rocephin. - urine cx : E. Coli  - sensitivity is pending.      # Bradycardia  - monitor on tele.    - replace electrolytes  - EKG unchanged except with more pronounced T wave flattening.      # FTT in adult  - weakness. Concern about inability to take his medications appropriately. Functional decline  - Dietician consulted     # Multiple falls  - fall risk  - PT/OT: recommending SNF.      # Possible early dementia  - [facility]-psych consulted. See note. - does not need inpatient psychiatric admission.   - lacks the capacity to make informed decisions for himself. - needs MOCA. - needs outpatient neurology/neuropsychology assessments.      # Hypothyroidism  - home dose of synthroid 150 mcg   - TSH greater than 100. Unclear if due to non-compliance vs insufficient dose     # CAD  - hold aspirin.      # DM Type 2  - monitor glucose. - check HgbA1C: 5.5. Hold oral Rx.  - low dose SSI coverage.      # Tobacco Dependence  - Recommended cessation     DVT Prophylaxis: [facility])  Diet: DIET CARB CONTROL; Carb Control: 4 carb choices (60 gms)/meal  Dietary Nutrition Supplements: Diabetic Oral Supplement  Code Status: DNR-CC    Needs SNF.         Gavin Swan MD

## 2020-10-16 VITALS
WEIGHT: 150.4 LBS | HEIGHT: 72 IN | SYSTOLIC BLOOD PRESSURE: 136 MMHG | TEMPERATURE: 97 F | RESPIRATION RATE: 16 BRPM | HEART RATE: 58 BPM | BODY MASS INDEX: 20.37 KG/M2 | DIASTOLIC BLOOD PRESSURE: 84 MMHG | OXYGEN SATURATION: 96 %

## 2020-10-16 LAB
ANION GAP SERPL CALCULATED.3IONS-SCNC: 12 MMOL/L (ref 3–16)
BUN BLDV-MCNC: 14 MG/DL (ref 7–20)
CALCIUM SERPL-MCNC: 9.3 MG/DL (ref 8.3–10.6)
CHLORIDE BLD-SCNC: 98 MMOL/L (ref 99–110)
CO2: 22 MMOL/L (ref 21–32)
CREAT SERPL-MCNC: 1.2 MG/DL (ref 0.8–1.3)
GFR AFRICAN AMERICAN: >60
GFR NON-AFRICAN AMERICAN: 57
GLUCOSE BLD-MCNC: 134 MG/DL (ref 70–99)
GLUCOSE BLD-MCNC: 87 MG/DL (ref 70–99)
GLUCOSE BLD-MCNC: 95 MG/DL (ref 70–99)
HCT VFR BLD CALC: 36.7 % (ref 40.5–52.5)
HEMOGLOBIN: 12.8 G/DL (ref 13.5–17.5)
MAGNESIUM: 1.7 MG/DL (ref 1.8–2.4)
MCH RBC QN AUTO: 32.9 PG (ref 26–34)
MCHC RBC AUTO-ENTMCNC: 34.9 G/DL (ref 31–36)
MCV RBC AUTO: 94.3 FL (ref 80–100)
PDW BLD-RTO: 15.8 % (ref 12.4–15.4)
PERFORMED ON: ABNORMAL
PERFORMED ON: NORMAL
PLATELET # BLD: 238 K/UL (ref 135–450)
PMV BLD AUTO: 8 FL (ref 5–10.5)
POTASSIUM REFLEX MAGNESIUM: 3.4 MMOL/L (ref 3.5–5.1)
RBC # BLD: 3.89 M/UL (ref 4.2–5.9)
SARS-COV-2, NAAT: NOT DETECTED
SODIUM BLD-SCNC: 132 MMOL/L (ref 136–145)
WBC # BLD: 7 K/UL (ref 4–11)

## 2020-10-16 PROCEDURE — U0002 COVID-19 LAB TEST NON-CDC: HCPCS

## 2020-10-16 PROCEDURE — 96366 THER/PROPH/DIAG IV INF ADDON: CPT

## 2020-10-16 PROCEDURE — 80048 BASIC METABOLIC PNL TOTAL CA: CPT

## 2020-10-16 PROCEDURE — 83735 ASSAY OF MAGNESIUM: CPT

## 2020-10-16 PROCEDURE — 85027 COMPLETE CBC AUTOMATED: CPT

## 2020-10-16 PROCEDURE — 6360000002 HC RX W HCPCS: Performed by: INTERNAL MEDICINE

## 2020-10-16 PROCEDURE — 6370000000 HC RX 637 (ALT 250 FOR IP): Performed by: INTERNAL MEDICINE

## 2020-10-16 PROCEDURE — G0378 HOSPITAL OBSERVATION PER HR: HCPCS

## 2020-10-16 PROCEDURE — 36415 COLL VENOUS BLD VENIPUNCTURE: CPT

## 2020-10-16 PROCEDURE — 2580000003 HC RX 258: Performed by: INTERNAL MEDICINE

## 2020-10-16 PROCEDURE — 99238 HOSP IP/OBS DSCHRG MGMT 30/<: CPT | Performed by: INTERNAL MEDICINE

## 2020-10-16 RX ORDER — CEFUROXIME AXETIL 500 MG/1
250 TABLET ORAL 2 TIMES DAILY
Refills: 0 | DISCHARGE
Start: 2020-10-16 | End: 2020-10-20

## 2020-10-16 RX ADMIN — POTASSIUM CHLORIDE 40 MEQ: 1500 TABLET, EXTENDED RELEASE ORAL at 08:32

## 2020-10-16 RX ADMIN — CEFTRIAXONE SODIUM 1 G: 1 INJECTION, POWDER, FOR SOLUTION INTRAMUSCULAR; INTRAVENOUS at 04:05

## 2020-10-16 RX ADMIN — Medication 10 ML: at 08:31

## 2020-10-16 RX ADMIN — LEVOTHYROXINE SODIUM 150 MCG: 150 TABLET ORAL at 07:25

## 2020-10-16 ASSESSMENT — PAIN SCALES - GENERAL: PAINLEVEL_OUTOF10: 0

## 2020-10-16 NOTE — PLAN OF CARE
Problem: Falls - Risk of:  Goal: Will remain free from falls  Description: Will remain free from falls  10/16/2020 0023 by Rody Thornton RN  Outcome: Ongoing  10/15/2020 1204 by Suma Rodriguez RN  Outcome: Ongoing  Goal: Absence of physical injury  Description: Absence of physical injury  Outcome: Ongoing     Problem: Infection:  Goal: Will remain free from infection  Description: Will remain free from infection  10/16/2020 0023 by Rody Thornton RN  Outcome: Ongoing  10/15/2020 1204 by Suma Rodriguez RN  Outcome: Ongoing     Problem: Safety:  Goal: Free from accidental physical injury  Description: Free from accidental physical injury  10/16/2020 0023 by Rody Thornton RN  Outcome: Ongoing  10/15/2020 1204 by Suma Rodriguez RN  Outcome: Ongoing  Goal: Free from intentional harm  Description: Free from intentional harm  Outcome: Ongoing     Problem: Daily Care:  Goal: Daily care needs are met  Description: Daily care needs are met  10/16/2020 0023 by Rody Thornton RN  Outcome: Ongoing  10/15/2020 1204 by Suma Rodriguez RN  Outcome: Ongoing     Problem: Pain:  Goal: Patient's pain/discomfort is manageable  Description: Patient's pain/discomfort is manageable  10/16/2020 0023 by Rdoy Thornton RN  Outcome: Ongoing  10/15/2020 1204 by Suma Rodriguez RN  Outcome: Ongoing  Goal: Pain level will decrease  Description: Pain level will decrease  Outcome: Ongoing  Goal: Control of acute pain  Description: Control of acute pain  Outcome: Ongoing  Goal: Control of chronic pain  Description: Control of chronic pain  Outcome: Ongoing     Problem: Skin Integrity:  Goal: Skin integrity will stabilize  Description: Skin integrity will stabilize  10/16/2020 0023 by Rody Thornton RN  Outcome: Ongoing  10/15/2020 1204 by Suma Rodriguez RN  Outcome: Ongoing     Problem: Discharge Planning:  Goal: Patients continuum of care needs are met  Description: Patients continuum of care needs are met  10/16/2020 0023 by Khushbu Urias RN  Outcome: Ongoing  10/15/2020 1204 by Jazmine Peñaloza RN  Outcome: Ongoing     Problem: Skin Integrity:  Goal: Will show no infection signs and symptoms  Description: Will show no infection signs and symptoms  10/16/2020 0023 by Khushbu Urias RN  Outcome: Ongoing  10/15/2020 1204 by Jazmine Peñaloza RN  Outcome: Ongoing  Goal: Absence of new skin breakdown  Description: Absence of new skin breakdown  Outcome: Ongoing     Problem: Nutrition  Goal: Optimal nutrition therapy  10/16/2020 0023 by Khushbu Urias RN  Outcome: Ongoing  10/15/2020 1204 by Jazmine Peñaloza RN  Outcome: Ongoing

## 2020-10-16 NOTE — PROGRESS NOTES
PM assessment complete. POCT glucose 103. Pt in bed, oriented to person, oriented to place, disoriented to time, disoriented to situation. Pt removed IV, states \"I won't be needing anymore medicine through that\"; explained that it will need to be replaced because meds are not finished. Vitals stable. Call light within reach. Bed locked,  in lowest position. Bed alarm on. Denies further needs at this time.

## 2020-10-16 NOTE — CARE COORDINATION
INTERDISCIPLINARY PLAN OF CARE CONFERENCE    Date/Time: 10/15/2020 11:56 AM  Completed by: Estefanía Frey, Case Management      Patient Name:  Nichole Warner  YOB: 1933  Admitting Diagnosis: CHANELL (acute kidney injury) ([facility] [address] [N17.9]     Admit Date/Time:  10/13/2020  9:01 PM    Chart reviewed. Interdisciplinary team contacted or reviewed plan related to patient progress and discharge plans. Disciplines included Case Management, Nursing, and Dietitian. Current Status: Stable  PT/OT recommendation for discharge plan of care: SNF    Expected D/C Disposition:  Rehab  Confirmed plan with patient and/or family Yes confirmed with: (name)  Alexandra Monet    Discharge Plan Comments:  Reviewed chart and met with pt who is working with therapy. Spoke with quita Nabila Sams re: dcp. Updated quita that bed available at [facility] on 10/15 and have started pre cert. Quita agreeable to plan. Will cont to follow and update.     Home O2 in place on admit: No  Pt informed of need to bring portable home O2 tank on day of discharge for nursing to connect prior to leaving:  Not Indicated  Verbalized agreement/Understanding:  Not Indicated
Daughter is OOT caring for another family member who is ill. She is the patients POA. Code status was addressed by Dr. Tashia Ordoñez upon admission. She can be reached by phone or if not her then call her  Tate Miles or daughter Elissa Carrera for any additional information. Radha Thomas has been working on placement at [facility] and is planning to apply for medicaid. With the recent increased weakness and frequency of falls they patient may need STR. Will need PT/OT eval and recs for pre-cert. +CM following. Referral called to [address]. @ [facility]. Faxed face sheet- [facility] Link  Bed available?: PENDING review  Insurance precertification required? Yes  Continuity of Care Form (MIGUEL) initiated? No   Hospital Exemption Notification (HENS) initiated? No    Initiate Level of Care (LOC), if Medicaid is the primary payer?  Not Indicated
medications are sent with patient to the facility as per protocol.

## 2020-10-16 NOTE — DISCHARGE SUMMARY
D/c on Ceftin     # Bradycardia  - monitored on tele. - replaced electrolytes  - EKG unchanged except with more pronounced T wave flattening.   - improved     # FTT in adult  - weakness. Concern about inability to take his medications appropriately. Functional decline  - Dietician consulted  D/c to SNF.      # Multiple falls  - fall risk  - PT/OT: recommending SNF.      # Possible early dementia  - Halley-psych consulted. See note. - does not need inpatient psychiatric admission.   - lacks the capacity to make informed decisions for himself. - needs MOCA. - needs outpatient neurology/neuropsychology assessments.      # Hypothyroidism  - home dose of synthroid 150 mcg   - TSH greater than 100. Unclear if due to non-compliance vs insufficient dose     # CAD  - held aspirin.      # DM Type 2  - monitored glucose. - checked HgbA1C: 5.5. Held oral Rx.  - low dose SSI coverage.      # Tobacco Dependence  - Recommended cessation     DVT Prophylaxis: [facility])    Procedures (Please Review Full Report for Details)  N/A    Consults    Psychiatry       Physical Exam at Discharge:    /84   Pulse 58   Temp 97 °F (36.1 °C) (Oral)   Resp 16   Ht 6' (1.829 m)   Wt 150 lb 6.4 oz (68.2 kg)   SpO2 96%   BMI 20.40 kg/m²   Gen: No distress. Alert. Eyes: PERRL. No sclera icterus. No conjunctival injection. ENT: No discharge. Pharynx clear. Neck: No JVD.  No Carotid Bruit. Trachea midline. Resp: No accessory muscle use. No crackles. No wheezes. No rhonchi. CV: Regular rate. Regular rhythm. No murmur. No rub. No edema. Capillary Refill: Brisk,< 3 seconds   Peripheral Pulses: +2 palpable, equal bilaterally   GI: Non-tender. Non-distended. No masses. No organomegaly. Normal bowel sounds. No hernia. Skin: Warm and dry. No nodule on exposed extremities. No rash on exposed extremities. M/S: No cyanosis. No joint deformity. No clubbing. Neuro: Awake. Grossly nonfocal    Psych: Oriented x 3.  No anxiety or agitation       CBC:   Recent Labs     10/14/20  0503 10/15/20  0534 10/16/20  0601   WBC 7.8 8.1 7.0   HGB 13.0* 12.8* 12.8*   HCT 37.3* 36.7* 36.7*   MCV 94.2 93.8 94.3    266 238     BMP:   Recent Labs     10/14/20  0503 10/14/20  1611 10/15/20  0534 10/16/20  0601    133* 132* 132*   K 2.7* 3.3* 3.3* 3.4*   CL 99 95* 96* 98*   CO2 24 28 24 22   PHOS 1.8*  --   --   --    BUN 19 19 17 14   CREATININE 1.3 1.4* 1.4* 1.2     LIVER PROFILE:   Recent Labs     10/13/20  2255 10/14/20  0503   AST 46* 37   ALT 23 20   LIPASE 49.0  --    BILITOT 0.5 0.4   ALKPHOS 55 53     UA:  Recent Labs     10/14/20  0119   COLORU Yellow   PHUR 6.0  6.0   WBCUA *   RBCUA 0-2   BACTERIA 4+*   CLARITYU CLOUDY*   SPECGRAV 1.020   LEUKOCYTESUR LARGE*   UROBILINOGEN 0.2   BILIRUBINUR Negative   BLOODU TRACE-INTACT*   GLUCOSEU Negative            CARDIAC ENZYMES  Recent Labs     10/13/20  2255   TROPONINI <0.01       CULTURES  Urine: E. coli    RADIOLOGY  CT CERVICAL SPINE WO CONTRAST   Final Result   1. No evidence of acute fracture or traumatic malalignment involving the   cervical spine   2. 6 mm nodule, right apex, unchanged. Nodule is stable for 2 years. RECOMMENDATIONS:   Fleischner Society guidelines for follow-up and management of incidentally   detected pulmonary nodules:      Single Solid Nodule:      Nodule size equals 6-8 mm: In a low-risk patient, CT at 6-12 months, then   consider CT at 18-24 months. - Low risk patients include individuals with minimal or absent history of   smoking and other known risk factors. - High risk patients include individuals with a history or smoking or known   risk factors. Reference: Radiology 2017   http://pubs. rsna.org/doi/full/10.1148/radiol. 5232666990         CT HEAD WO CONTRAST   Final Result   1. No acute hemorrhage. 2. Peripheral increased attenuation of bilateral chronic subdural collections   probably represents subacute subdural hemorrhage. Critical results were called by Dr. Guillermina Navas MD to Echo Rodriguez on   10/14/2020 at 00:20. XR CHEST PORTABLE   Final Result   No acute disease. Discharge Medications     Medication List      START taking these medications    cefUROXime 500 MG tablet  Commonly known as:  CEFTIN  Take 0.5 tablets by mouth 2 times daily for 4 days        CONTINUE taking these medications    Acetaminophen 325 MG Caps     levothyroxine 150 MCG tablet  Commonly known as:  SYNTHROID     Walker Misc  1 each by Does not apply route daily Dispense and Fit for injury to lower extremity and weakness. STOP taking these medications    bacitracin 500 UNIT/GM ointment     chlorthalidone 25 MG tablet  Commonly known as:  HYGROTON     heparin (porcine) 5000 UNIT/ML injection     metFORMIN 850 MG tablet  Commonly known as:  GLUCOPHAGE     polyethylene glycol 17 g packet  Commonly known as:  GLYCOLAX     sennosides-docusate sodium 8.6-50 MG tablet  Commonly known as:  SENOKOT-S     traMADol 50 MG tablet  Commonly known as:  ULTRAM           Where to Get Your Medications      Information about where to get these medications is not yet available    Ask your nurse or doctor about these medications  · cefUROXime 500 MG tablet           Discharged in stable condition to SNF. Gutierrez Herrera Total time 35 minutes. > 50%  dominated by counseling and coordination of care. I spoke to patient's dtr over the phone and updated plan of care. D/W DC planner       Follow Up:   Follow up with  [facility] MD Carlos Polanco MD   10/16/2020

## 2020-10-16 NOTE — PROGRESS NOTES
Pt has removed second IV this shift. Refuses another IV stick. States that he \"can't sleep with it in.\" Emphasized importance of IV and getting meds through. Pt refuses IV stick.

## 2020-10-16 NOTE — DISCHARGE INSTR - COC
Continuity of Care Form    Patient Name: Sheila Recinos   :  1/10/1933  MRN:  [id number]    Admit date:  10/13/2020  Discharge date:  10/16/2020    Code Status Order: [facility]   Advance Directives:   [facility] Documentation       Date/Time Healthcare Directive Type of Healthcare Directive Copy in [address] Agent's Name Healthcare Agent's Phone Number    10/14/20 0862  Yes, patient has an advance directive for healthcare treatment  Health care treatment directive  Yes, copy in chart  Adult [address]  [phone]            Admitting Physician:  Perry Wilkins MD  PCP: Jennifer Ge MD    Discharging Nurse: Aspen Ernandez RN  [address] Unit/Room#: 5291/8075-41  Discharging Unit Phone Number: [phone]    Emergency Contact:   Extended Emergency Contact Information  Primary Emergency Contact: TiffanieCookie  Address: [address] [place] [address] Phone: [phone]  Mobile Phone: [phone]  Relation: Child  Secondary Emergency Contact: Rosette Lala  [place] Phone: [phone]  Relation: Other    Past Surgical History:  Past Surgical History:   Procedure Laterality Date    EYE SURGERY      cataract    HERNIA REPAIR         Immunization History:   Immunization History   Administered Date(s) Administered    Influenza Virus Vaccine 2020    Td, unspecified formulation 2011       Active Problems:  Patient Active Problem List   Diagnosis Code    CHANELL (acute kidney injury) ([facility] [address] N17.9    Severe protein-calorie malnutrition ([facility] [address] E43    Dehydration E86.0    Hypokalemia E87.6    Hypomagnesemia E83.42    Hypothyroidism E03.9       Isolation/Infection:   Isolation            No Isolation          Patient Infection Status       None to display            Nurse Assessment:  Last Vital Signs: /84   Pulse 58   Temp 97 °F (36.1 °C) (Oral)   Resp 16   Ht 6' (1.829 m) Wt 150 lb 6.4 oz (68.2 kg)   SpO2 96%   BMI 20.40 kg/m²     Last documented pain score (0-10 scale): Pain Level: 0  Last Weight:   Wt Readings from Last 1 Encounters:   10/14/20 150 lb 6.4 oz (68.2 kg)     Mental Status:  oriented, alert and able to concentrate and follow conversation    IV Access:  - None    Nursing Mobility/ADLs:  Walking   Assisted  Transfer  Assisted  Bathing  Assisted  Dressing  Assisted  Toileting  Assisted  Feeding  Independent  Med Admin  Assisted  Med Delivery   whole    Wound Care Documentation and Therapy:  Wound 10/14/20 Nose Right;Lateral (Active)   Wound Image   10/14/20 0326   Dressing/Treatment Open to air 10/16/20 0900   Wound Length (cm) 1 cm 10/14/20 0326   Wound Width (cm) 2.5 cm 10/14/20 0326   Wound Surface Area (cm^2) 2.5 cm^2 10/14/20 0326   Wound Assessment Bleeding 10/14/20 0830   Drainage Amount None 10/16/20 0900   Drainage Description Sanguinous 10/15/20 0853   Odor None 10/16/20 0900   Number of days: 2        Elimination:  Continence:   · Bowel: No  · Bladder: No  Urinary Catheter: None   Colostomy/Ileostomy/Ileal Conduit: No       Date of Last BM: 10/15/2020    Intake/Output Summary (Last 24 hours) at 10/16/2020 1121  Last data filed at 10/16/2020 0849  Gross per 24 hour   Intake 720 ml   Output 750 ml   Net -30 ml     I/O last 3 completed shifts: In: 56 [P.O.:480; I.V.:10]  Out: 950 [Urine:950]    Safety Concerns: At Risk for Falls    Impairments/Disabilities:      Hearing    Nutrition Therapy:  Current Nutrition Therapy:   - Oral Diet:  Carb Control 4 carbs/meal (1800kcals/day)    Routes of Feeding: Oral  Liquids: No Restrictions  Daily Fluid Restriction: no  Last Modified Barium Swallow with Video (Video Swallowing Test): not done    Treatments at the Time of Hospital Discharge:   Respiratory Treatments: AN  Oxygen Therapy:  is not on home oxygen therapy.   Ventilator:    - No ventilator support    Rehab Therapies: Physical Therapy and Occupational Therapy  Weight Bearing Status/Restrictions: No weight bearing restirctions  Other Medical Equipment (for information only, NOT a DME order):  walker  Other Treatments: NA    Patient's personal belongings (please select all that are sent with patient):  Glasses, Hearing Aides bilateral    RN SIGNATURE:  Electronically signed by Jc Anton RN on 10/16/20 at 12:22 PM EDT    CASE MANAGEMENT/SOCIAL WORK SECTION    Inpatient Status Date: 10/16/2020    Readmission Risk Assessment Score:  Readmission Risk              Risk of Unplanned Readmission:        12           Discharging to Facility/ Agency   · Name: [facility]unt   · Phone: [phone]  Fax: [phone]    / signature: Electronically signed by Maddie Hernandez RN on 10/16/20 at 12:48 PM EDT    PHYSICIAN SECTION    Prognosis: Good    Condition at Discharge: Stable    Rehab Potential (if transferring to Rehab): Good    Recommended Labs or Other Treatments After Discharge:     Physician Certification: I certify the above information and transfer of Scott Brown  is necessary for the continuing treatment of the diagnosis listed and that he requires [facility] for less 30 days.      Update Admission H&P: No change in H&P    PHYSICIAN SIGNATURE:  Electronically signed by Germán Torres MD on 10/16/20 at 11:21 AM EDT

## 2020-10-28 ENCOUNTER — TELEPHONE (OUTPATIENT)
Dept: ADMINISTRATIVE | Age: 85
End: 2020-10-28

## 2022-02-14 ENCOUNTER — OUTSIDE SERVICES (OUTPATIENT)
Dept: WOUND CARE | Age: 87
End: 2022-02-14
Payer: MEDICARE

## 2022-02-14 DIAGNOSIS — I70.233 ATHEROSCLEROSIS OF NATIVE ARTERY OF RIGHT LOWER EXTREMITY WITH ULCERATION OF ANKLE (HCC): ICD-10-CM

## 2022-02-14 DIAGNOSIS — L97.511 NEUROPATHIC ULCER OF RIGHT FOOT, LIMITED TO BREAKDOWN OF SKIN (HCC): ICD-10-CM

## 2022-02-14 PROCEDURE — 97597 DBRDMT OPN WND 1ST 20 CM/<: CPT | Performed by: CLINICAL NURSE SPECIALIST

## 2022-02-14 NOTE — PROGRESS NOTES
88 [facility]    Patient name: Zakia Garcia  :   1/10/1933  Facility:  [facility] 144 of Service: nursing facility (91)    Primary diagnosis for wound-care consultation: Open area right ankle, lateral    Additional ulcer(s) noted? None    History of Present Illness: Full-thickness ulcer to right ankle, lateral.  Vascular/Neuropathic ulcer. Diabetic.  10/11/2021: A1c 6.6. No complaints of pain. Neuropathic with monofilament testing. Able to sense 1/10. Resident was wearing shoes that added pressure to this area. Current shoes are not touching this area. Ulcer is punched out in appearance. PT and DP pulse audible with Doppler. Dry thick nails. Encourage not to sleep on right side. Offloading boots are contraindicated due to safety issues. Resident transfers himself in and out of bed. Periwound with increased erythema. Recommend antibiotic therapy. Debrided of fibrin and biofilm today. Recommend arterial duplex studies if nonhealing. Current treatment is appropriate. No fever or chills. Review of Systems: Pertinent systems reviewed in the HPI; all other systems reviewed, and negative. Pertinent elements of past medical, surgical, family, and/or social history:  Atherosclerotic heart disease, diabetes, essential hypertension, tobacco use and unspecified dementia    Medications and allergies are detailed in the nursing home chart, and were reviewed by me today.  _______________________    General Physical exam:    Vital signs:  157/85, 98.2, 64, 18, 96% room air    General Appearance: alert and oriented to person, place and time, forgetful, well developed and well-nourished, in no acute distress  Psychiatric:  Mood and affect appropriate for situation  Skin: warm and dry, no rash  Head: normocephalic and atraumatic  Eyes: pupils equal, round, sclerae anicteric, conjunctivae normal  ENT: no thrush or oral ulcers  Neck:No complaints, normal appearance  Pulmonary/Chest: Respirations easy at rest, no cough or respiratory distress  Cardiovascular: No chest pain, normal rate, toes warm, cap refill normal, dry thick nails, PT and DP pulse audible with Doppler  Abdomen: No nausea or vomiting  Extremities: no cyanosis, edema or cellulitis  Musculoskeletal: Ambulatory, moves all extremities, no deformities  Neurologic: distal sensation to light touch impaired with monofilament testing. Insensate. Able tosense 1/10, no allodynia. Wound exam:    Wound location: Right Ankle, Lateral   Length (cm) 2   Width (cm) 2.5   Depth (cm) 0.2   Tunneling 0   Undermining 0    Wound type:   Vascular/Neuropathic  Grade - stage - thickness: Full thickness     Description of periwound: Erythema    Description of wound bed: Wound with red granulation, biofilm and fibrin. Wound bed moist, moderate amount of serous drainage, edges open and attached. Surrounding tissue and ulcer with with increased erythema. No purulence or malodor. No complaints of pain, neuropathic.  _______________________    Recent labs and data reviewed: 2/11/2022: Glucose 118, BUN 19, creatinine 1.4, GFR 48. 10/11/21: A1c 6.6  _______________________     Katerina  diagnoses & assessment: Full-thickness ulcer to right ankle, lateral.  Vascular/Neuropathic ulcer. Diabetic.  10/11/2021: A1c 6.6. No complaints of pain. Neuropathic with monofilament testing. Able to sense 1/10. Resident was wearing shoes that added pressure to this area. Current shoes are not touching this area. Ulcer is punched out in appearance. PT and DP pulse audible with Doppler. Dry thick nails. Encourage not to sleep on right side. Offloading boots are contraindicated due to safety issues. Resident transfers himself in and out of bed. Periwound with increased erythema. Recommend antibiotic therapy. Debrided of fibrin and biofilm today. Recommend arterial duplex studies if nonhealing.     Debridement is  indicated today, based on the history and exam above.  _______________________    Procedure:    Consent obtained. Time out performed per [facility]. Topical anesthetic applied: 4% topical lidocaine. Using a curette, I sharply debrided the right ankle ulcer(s) down through and including the removal of epidermis and dermis. The type(s) of tissue debrided included fibrin and biofilm. Total Surface Area Debrided: 5 sq cm. The ulcers were then irrigated with normal saline solution. The procedure was completed with a small amount of bleeding, and hemostasis was by pressure. The patient tolerated the procedure well, with no significant complications. The patient's level of pain during and after the procedure was monitored. If post-debridement measurements are significantly different from initial measurements, those will be noted here.   _______________________    Recommendations:    - Dressings / Compression / Offloading: Triad, alginate AG and foam dressing. Encouraged to sleep on back or left side. - Labs / Diagnostic studies: Recommend Arterial duplex studies if nonhealing    - Medications / nutritional support: Prostat daily.  Stress tab with zinc    - Further Consultations recommended: None    - Anticipated follow-up: Weekly evaluation  _______________________    Electronically signed by DANIELLE Jensen CNP on 2/14/2022 at 3:34 PM

## 2022-02-21 ENCOUNTER — OUTSIDE SERVICES (OUTPATIENT)
Dept: WOUND CARE | Age: 87
End: 2022-02-21
Payer: MEDICARE

## 2022-02-21 DIAGNOSIS — G62.9 NEUROPATHY: ICD-10-CM

## 2022-02-21 DIAGNOSIS — I70.233 ATHEROSCLEROSIS OF NATIVE ARTERY OF RIGHT LOWER EXTREMITY WITH ULCERATION OF ANKLE (HCC): ICD-10-CM

## 2022-02-21 PROCEDURE — 97597 DBRDMT OPN WND 1ST 20 CM/<: CPT | Performed by: CLINICAL NURSE SPECIALIST

## 2022-02-21 NOTE — PROGRESS NOTES
88 [facility]    Patient name: Kaitlin Godwin  :   1/10/1933  Facility:  [facility]  Place of Service: nursing facility (29)    Primary diagnosis for wound-care consultation: Vascular/neuropathic ulcer right ankle, lateral.    Additional ulcer(s) noted? None    History of Present Illness: Vascular/neuropathic ulcer right ankle, lateral slow to heal.  Debrided of fibrin today and biofilm. Manual ESTELITA on 2022: Overall ESTELITA: 0.64. Arterial duplex studies ordered. No signs of wound infection. No fever or chills. Review of Systems: Pertinent systems reviewed in the HPI; all other systems reviewed, and negative. Pertinent elements of past medical, surgical, family, and/or social history: Atherosclerotic heart disease, diabetes, essential hypertension, tobacco use and unspecified dementia.     Medications and allergies are detailed in the nursing home chart, and were reviewed by me today.  _______________________    General Physical exam:    Vital signs:  151/75, 98.2, 64, 18, 96% room air    General Appearance: alert and oriented to person, place and time, forgetful, well developed and well-nourished, in no acute distress  Psychiatric:  Mood and affect appropriate for situation  Skin: warm and dry, no rash  Head: normocephalic and atraumatic  Eyes: pupils equal, round, sclerae anicteric, conjunctivae normal  ENT: no thrush or oral ulcers  Neck:No complaints, normal appearance  Pulmonary/Chest: Respirations easy at rest, no cough or respiratory distress  Cardiovascular: No chest pain, normal rate, toes warm, cap refill normal, PT and DP pulse audible with Doppler, nails are thick and dry  Abdomen: No nausea or vomiting  Extremities: no cyanosis, edema or cellulitis  Musculoskeletal: Ambulatory, moves all extremities, no deformities  Neurologic: distal sensation to light touch impaired, able to sense 1 out of 10 with monofilament testing intact, no allodynia. Wound exam:    Wound location: Right Ankle, Lateral   Length (cm) 2   Width (cm) 2.4   Depth (cm) 0.2   Tunneling 0   Undermining 0    Wound type:   Vascular/Neuropathic  Grade - stage - thickness: Full thickness     Description of periwound: Slight erythema    Description of wound bed: Wound with red granulation and fibrin. Wound bed moist, moderate amount of serous drainage, edges open and attached. Surrounding tissue and ulcer without signs and symptoms of infection. No purulence, malodor, erythema, increased temperature, or increased pain.  _______________________    Recent labs and data reviewed: No new labs  _______________________     Swetha Lax diagnoses & assessment: Vascular/neuropathic ulcer right ankle, lateral slow to heal.  Debrided of fibrin today and biofilm. Manual ESTELITA on 2/16/2022: Overall ESTELITA: 0.64. Arterial duplex studies ordered. No signs of wound infection. Debridement is  indicated today, based on the history and exam above.  _______________________    Procedure:    Consent obtained. Time out performed per [facility]. Topical anesthetic applied: 4% topical lidocaine. Using a curette, I sharply debrided the right ankle ulcer(s) down through and including the removal of epidermis and dermis. The type(s) of tissue debrided included fibrin and biofilm. Total Surface Area Debrided: 4.8 sq cm. The ulcers were then irrigated with normal saline solution. The procedure was completed with a small amount of bleeding, and hemostasis was by pressure. The patient tolerated the procedure well, with no significant complications. The patient's level of pain during and after the procedure was monitored. If post-debridement measurements are significantly different from initial measurements, those will be noted here.   _______________________    Recommendations:    - Dressings / Compression / Offloading: Triad, alginate AG and foam dressing.   Resident is encouraged to sleep on his back or his left side.     - Labs / Diagnostic studies: Arterial duplex studies pending    - Medications / nutritional support: Prostat daily stress tab with zinc.    - Further Consultations recommended: None    - Anticipated follow-up: Weekly evaluation  _______________________    Electronically signed by DANIELLE Livingston CNP on 2/21/2022 at 5:15 PM

## 2022-02-28 ENCOUNTER — OUTSIDE SERVICES (OUTPATIENT)
Dept: WOUND CARE | Age: 87
End: 2022-02-28
Payer: MEDICARE

## 2022-02-28 DIAGNOSIS — I70.233 ATHEROSCLEROSIS OF NATIVE ARTERY OF RIGHT LOWER EXTREMITY WITH ULCERATION OF ANKLE (HCC): ICD-10-CM

## 2022-02-28 PROCEDURE — 97597 DBRDMT OPN WND 1ST 20 CM/<: CPT | Performed by: CLINICAL NURSE SPECIALIST

## 2022-02-28 NOTE — PROGRESS NOTES
88 [facility]    Patient name: Kam Elaine  :   1/10/1933  Facility:  [facility]  Place of Service: nursing facility (33)    Primary diagnosis for wound-care consultation: Vascular/neuropathic ulcer right ankle, lateral    Additional ulcer(s) noted? None    History of Present Illness: Vascular/neuropathic ulcer to right ankle, lateral nonhealing. Debrided of fibrin and biofilm again today. No signs of wound infection. Erythema to periwound and outline of adhesive foam dressing. We will change cover dressing to gauze and Kerlix. 2022: Arterial duplex studies findings: Velocities: CFA: 43, FA-proximal 126, femoral artery mid: 107, femoral artery, distal: 108, popliteal artery: 150, Dieter artery: 198, posterior tibial artery: 115, anterior tibial artery: 55, dorsalis pedis artery 41. Monophasic waveforms: Dieter artery, PTA, EVERETT, DPA. Mild atherosclerotic disease plaque is identified. The waveforms are predominantly biphasic. Impression: No evidence of hemodynamically significant stenosis. Considering velocity and waveforms with nonhealing ulcer, vascular consult is recommended if okay with patient, patient's family and physician. Manual calculation of ESTELITA: 0.68. Review of Systems: Pertinent systems reviewed in the HPI; all other systems reviewed, and negative. Pertinent elements of past medical, surgical, family, and/or social history: Atherosclerotic heart disease, diabetes, essential hypertension, neuropathy, PVD by assessment, tobacco use and unspecified dementia.     Medications and allergies are detailed in the nursing home chart, and were reviewed by me today.  _______________________    General Physical exam:    Vital signs:  157/85, 97.5, 64, 18, 94% room air    General Appearance: alert and oriented to person, place and time, forgetful, well developed and well-nourished, in no acute distress  Psychiatric:  Mood and affect appropriate for situation  Skin: warm and dry, no rash  Head: normocephalic and atraumatic  Eyes: pupils equal, round, sclerae anicteric, conjunctivae normal  ENT: no thrush or oral ulcers  Neck:No complaints, normal appearance  Pulmonary/Chest: Respirations easy at rest, no cough or respiratory distress  Cardiovascular: No chest pain, normal rate, toes warm, cap refill normal, PT and DP pulse audible with doppler, nails are thick and dry  Abdomen: No nausea or vomiting  Extremities: no cyanosis, edema or cellulitis  Musculoskeletal: Ambulatory, moves all extremities, no deformities  Neurologic: distal sensation to light touch impaired with neuropathy, no allodynia. Wound exam:    Wound location: Right Ankle, lateral   Length (cm) 2   Width (cm) 2.5   Depth (cm) 0.2   Undermining 0    Wound type:   Vascular/Neuropathic  Grade - stage - thickness: Full thickness     Description of periwound: Erythema in outline of dressing    Description of wound bed: Wound with red granulation, biofilm and fibrin. Wound bed moist, moderate amount of serous drainage, edges open and attached. Surrounding tissue and ulcer without signs and symptoms of infection. No purulence, malodor, increased temperature, or increased pain.  _______________________    Recent labs and data reviewed: 2/22/2022: Arterial duplex studies findings: Velocities: CFA: 43, FA-proximal 126, femoral artery mid: 107, femoral artery, distal: 108, popliteal artery: 150, Dieter artery: 198, posterior tibial artery: 115, anterior tibial artery: 55, dorsalis pedis artery 41. Monophasic waveforms: Dieter artery, PTA, EVERETT, DPA. Mild atherosclerotic disease plaque is identified. The waveforms are predominantly biphasic. Impression: No evidence of hemodynamically significant stenosis. Considering velocity and waveforms with nonhealing ulcer, vascular consult is recommended if okay with patient, patient's family and physician.   Manual calculation of ESTELITA: 0.68.  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 Debrided of fibrin and biofilm again today. No signs of wound infection. Erythema to periwound and outline of adhesive foam dressing. We will change cover dressing to gauze and Kerlix. 2/22/2022: Arterial duplex studies findings: Velocities: CFA: 43, FA-proximal 126, femoral artery mid: 107, femoral artery, distal: 108, popliteal artery: 150, Dieter artery: 198, posterior tibial artery: 115, anterior tibial artery: 55, dorsalis pedis artery 41. Monophasic waveforms: Dieter artery, PTA, EVERETT, DPA. Mild atherosclerotic disease plaque is identified. The waveforms are predominantly biphasic. Impression: No evidence of hemodynamically significant stenosis. Considering velocity and waveforms with nonhealing ulcer, vascular consult is recommended if okay with patient, patient's family and physician. Manual calculation of ESTELITA: 0.68. Debridement is  indicated today, based on the history and exam above.  _______________________    Procedure:    Consent obtained. Time out performed per [facility]. Topical anesthetic applied: 4% topical lidocaine. Using a curette, I sharply debrided the right ankle ulcer(s) down through and including the removal of epidermis and dermis. The type(s) of tissue debrided included fibrin and biofilm. Total Surface Area Debrided: 5 sq cm. The ulcers were then irrigated with normal saline solution. The procedure was completed with a small amount of bleeding, and hemostasis was by pressure. The patient tolerated the procedure well, with no significant complications. The patient's level of pain during and after the procedure was monitored.  If post-debridement measurements are significantly different from initial measurements, those will be noted here.   _______________________    Recommendations:    - Dressings / Compression / Offloading: Triad, alginate AG, gauze, pad ankle then Kerlix.     - Labs / Diagnostic studies: None    - Medications / nutritional support: Prostat and stress tab with zinc daily    - Further Consultations recommended: Vascular consult if ok with Resident, Family, Physician    - Anticipated follow-up: Weekly evaluation  _______________________    Electronically signed by DANIELLE Erickson CNP on 2/28/2022 at 4:01 PM

## 2022-03-14 ENCOUNTER — OUTSIDE SERVICES (OUTPATIENT)
Dept: WOUND CARE | Age: 87
End: 2022-03-14
Payer: MEDICARE

## 2022-03-14 DIAGNOSIS — I70.233 ATHEROSCLEROSIS OF NATIVE ARTERY OF RIGHT LOWER EXTREMITY WITH ULCERATION OF ANKLE (HCC): ICD-10-CM

## 2022-03-14 DIAGNOSIS — G62.9 NEUROPATHY: ICD-10-CM

## 2022-03-14 PROCEDURE — 97597 DBRDMT OPN WND 1ST 20 CM/<: CPT | Performed by: CLINICAL NURSE SPECIALIST

## 2022-03-15 NOTE — PROGRESS NOTES
88 [facility]    Patient name: Kaitlin Godwin  :   1/10/1933   Facility:  [facility]  Place of Service: nursing facility (23)    Primary diagnosis for wound-care consultation: Vascular/Neuropathic ulcer right ankle, lateral.     Additional ulcer(s) noted? None     History of Present Illness: Vascular/Neuropathic ulcer right ankle, lateral. Slow to heal. Family declines vascular consultation. Debrided of fibrin and biofilm. No signs of wound infection. Appetite good. Protein supplements provided. No fever chills. Review of Systems: Pertinent systems reviewed in the HPI; all other systems reviewed, and negative. Pertinent elements of past medical, surgical, family, and/or social history: Atherosclerotic heart disease, diabetes, essential hypertension, neuropathy, PVD, tobacco use and unspecified dementia. Medications and allergies are detailed in the nursing home chart, and were reviewed by me today.  _______________________    General Physical exam:    Vital signs:  147/72, 97.6, 82, 18, 94%    General Appearance: alert and oriented to person, place and time, forgetful, well developed and well-nourished, in no acute distress  Psychiatric:  Mood and affect appropriate for situation  Skin: warm and dry, no rash  Head: normocephalic and atraumatic  Eyes: pupils equal, round, sclerae anicteric, conjunctivae normal  ENT: no thrush or oral ulcers  Neck:No complaints, normal appearance  Pulmonary/Chest: Respirations easy at rest, no cough or respiratory distress  Cardiovascular: No chest pain, normal rate, toes warm, cap refill normal, DP and PT pulse audible with Doppler, nails are thick and dry  Abdomen: No nausea or vomiting  Extremities: no cyanosis, edema or cellulitis  Musculoskeletal: Ambulatory, moves all extremities, no deformities  Neurologic: distal sensation to light touch intact, no allodynia.       Wound exam:    Wound location: Right Ankle, Lateral   Length (cm) 2   Width (cm) 2.5   Depth (cm) 0.2   Tunneling 0   Undermining 0    Wound type:   Vascular/Neuropathic  Grade - stage - thickness: Full thickness     Description of periwound: Slight erythema    Description of wound bed: Wound with red granulation, biofilm and fibrin. Wound bed moist, moderate amount of serous drainage, edges open and attached. Surrounding tissue and ulcer without signs and symptoms of infection. No purulence, malodor, erythema, increased temperature, or increased pain.  _______________________    Recent labs and data reviewed: No new labs  _______________________     [facility] diagnoses & assessment: Vascular/Neuropathic ulcer right ankle, lateral. Slow to heal. Family declines vascular consultation. Debrided of fibrin and biofilm. No signs of wound infection. Appetite good. Protein supplements provided. Debridement is  indicated today, based on the history and exam above.  _______________________    Procedure:    Consent obtained. Time out performed per [facility]. Topical anesthetic applied: 4% topical lidocaine. Using a curette, I sharply debrided the right ankle ulcer(s) down through and including the removal of epidermis and dermis. The type(s) of tissue debrided included fibrin and biofilm. Total Surface Area Debrided: 5 sq cm. The ulcers were then irrigated with normal saline solution. The procedure was completed with a small amount of bleeding, and hemostasis was by pressure. The patient tolerated the procedure well, with no significant complications. The patient's level of pain during and after the procedure was monitored. If post-debridement measurements are significantly different from initial measurements, those will be noted here.   _______________________    Recommendations:    - Dressings / Compression / Offloading: Triad, alginate AG, gauze, pad ankle and Kerlix.     - Labs / Diagnostic studies: None    - Medications / nutritional

## 2022-03-17 LAB
A/G RATIO: 1.7 (ref 1.1–2.2)
ALBUMIN SERPL-MCNC: 4 G/DL (ref 3.4–5)
ALP BLD-CCNC: 128 U/L (ref 40–129)
ALT SERPL-CCNC: 11 U/L (ref 10–40)
ANION GAP SERPL CALCULATED.3IONS-SCNC: 18 MMOL/L (ref 3–16)
AST SERPL-CCNC: 14 U/L (ref 15–37)
BASOPHILS ABSOLUTE: 0.1 K/UL (ref 0–0.2)
BASOPHILS RELATIVE PERCENT: 1 %
BILIRUB SERPL-MCNC: <0.2 MG/DL (ref 0–1)
BUN BLDV-MCNC: 23 MG/DL (ref 7–20)
CALCIUM SERPL-MCNC: 9.2 MG/DL (ref 8.3–10.6)
CHLORIDE BLD-SCNC: 103 MMOL/L (ref 99–110)
CO2: 21 MMOL/L (ref 21–32)
CREAT SERPL-MCNC: 1.4 MG/DL (ref 0.8–1.3)
EOSINOPHILS ABSOLUTE: 0.4 K/UL (ref 0–0.6)
EOSINOPHILS RELATIVE PERCENT: 4.3 %
GFR AFRICAN AMERICAN: 58
GFR NON-AFRICAN AMERICAN: 48
GLUCOSE BLD-MCNC: 267 MG/DL (ref 70–99)
HCT VFR BLD CALC: 36.8 % (ref 40.5–52.5)
HEMOGLOBIN: 12.1 G/DL (ref 13.5–17.5)
LYMPHOCYTES ABSOLUTE: 1.9 K/UL (ref 1–5.1)
LYMPHOCYTES RELATIVE PERCENT: 22.7 %
MCH RBC QN AUTO: 29.4 PG (ref 26–34)
MCHC RBC AUTO-ENTMCNC: 32.9 G/DL (ref 31–36)
MCV RBC AUTO: 89.5 FL (ref 80–100)
MONOCYTES ABSOLUTE: 0.8 K/UL (ref 0–1.3)
MONOCYTES RELATIVE PERCENT: 9.2 %
NEUTROPHILS ABSOLUTE: 5.3 K/UL (ref 1.7–7.7)
NEUTROPHILS RELATIVE PERCENT: 62.8 %
PDW BLD-RTO: 14.8 % (ref 12.4–15.4)
PLATELET # BLD: 263 K/UL (ref 135–450)
PMV BLD AUTO: 8 FL (ref 5–10.5)
POTASSIUM SERPL-SCNC: 3.6 MMOL/L (ref 3.5–5.1)
RBC # BLD: 4.11 M/UL (ref 4.2–5.9)
SODIUM BLD-SCNC: 142 MMOL/L (ref 136–145)
TOTAL PROTEIN: 6.3 G/DL (ref 6.4–8.2)
WBC # BLD: 8.4 K/UL (ref 4–11)

## 2022-03-21 ENCOUNTER — OUTSIDE SERVICES (OUTPATIENT)
Dept: WOUND CARE | Age: 87
End: 2022-03-21
Payer: MEDICARE

## 2022-03-21 DIAGNOSIS — I70.233 ATHEROSCLEROSIS OF NATIVE ARTERY OF RIGHT LOWER EXTREMITY WITH ULCERATION OF ANKLE (HCC): ICD-10-CM

## 2022-03-21 PROCEDURE — 97597 DBRDMT OPN WND 1ST 20 CM/<: CPT | Performed by: CLINICAL NURSE SPECIALIST

## 2022-03-22 NOTE — PROGRESS NOTES
88 [facility]    Patient name: Elroy Howell  :   9-  Facility:  [facility]  Place of Service: nursing facility (19)    Primary diagnosis for wound-care consultation: Vascular/Neuropathic ulcer right ankle, lateral    Additional ulcer(s) noted? None    History of Present Illness: Vascular/Neuropathic ulcer right ankle, lateral. Nonhealing. Debrided of fibrin and biofilm. No signs of wound infection at this time. Removes gauze wrap dressing. Change treatment to adhesive foam.  Appetite is good. Protein supplements are provided. Keflex for wound infection. No fever or chills. Review of Systems: Pertinent systems reviewed in the HPI; all other systems reviewed, and negative. Pertinent elements of past medical, surgical, family, and/or social history: PVD, atherosclerotic heart disease, diabetes, essential hypertension, neuropathy, tobacco use and unspecified dementia.     Medications and allergies are detailed in the nursing home chart, and were reviewed by me today.  _______________________    General Physical exam:    Vital signs:  128/70, 97.5, 66, 18, 95%    General Appearance: alert and oriented to person, place and time, forgetful, well developed and well-nourished, in no acute distress  Psychiatric:  Mood and affect appropriate for situation  Skin: warm and dry, no rash  Head: normocephalic and atraumatic  Eyes: pupils equal, round, sclerae anicteric, conjunctivae normal  ENT: no thrush or oral ulcers  Neck:No complaints, normal appearance  Pulmonary/Chest: Respirations easy at rest, no cough or respiratory distress  Cardiovascular: No chest pain, normal rate, toes warm, cap refill normal, PT and DP pulse audible with doppler  Abdomen: No nausea or vomiting  Extremities: no cyanosis, edema or cellulitis  Musculoskeletal: Ambulatory, moves all extremities, no deformities  Neurologic: distal sensation to light touch intact, no Diagnostic studies: None    - Medications / nutritional support: Prostat, Stress tab with zinc daily    - Further Consultations recommended: Vascular consult declined    - Anticipated follow-up: Weekly evaluation  _______________________    Electronically signed by DANIELLE Diehl CNP on 3/22/2022 at 9:24 AM

## 2022-04-04 ENCOUNTER — OUTSIDE SERVICES (OUTPATIENT)
Dept: WOUND CARE | Age: 87
End: 2022-04-04
Payer: MEDICARE

## 2022-04-04 DIAGNOSIS — I70.233 ATHEROSCLEROSIS OF NATIVE ARTERY OF RIGHT LOWER EXTREMITY WITH ULCERATION OF ANKLE (HCC): ICD-10-CM

## 2022-04-04 DIAGNOSIS — G62.9 NEUROPATHY: ICD-10-CM

## 2022-04-04 PROCEDURE — 97597 DBRDMT OPN WND 1ST 20 CM/<: CPT | Performed by: CLINICAL NURSE SPECIALIST

## 2022-04-05 NOTE — PROGRESS NOTES
[address] [facility]    Patient name: Yan Hernández  :   1-  Facility:  [facility]  Place of Service: nursing facility (44)    Primary diagnosis for wound-care consultation: Vascular/Neuropathic ulcer right ankle, lateral    Additional ulcer(s) noted? 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 Nonhealing. Debrided of fibrin and biofilm today. No signs of wound infection. Appetite is good. Protein supplements provided. No fever or chills. Review of Systems: Pertinent systems reviewed in the HPI; all other systems reviewed, and negative. Pertinent elements of past medical, surgical, family, and/or social history: PVD, atherosclerotic heart disease, diabetes, essential hypertension, neuropathy, tobacco use and unspecified dementia    Medications and allergies are detailed in the nursing home chart, and were reviewed by me today.  _______________________    General Physical exam:    Vital signs:  135/71, 97.8, 64, 20, 96% room air    General Appearance: alert and oriented to person, place and time, forgetful, well developed and well-nourished, in no acute distress  Psychiatric:  Mood and affect appropriate for situation  Skin: warm and dry, no rash  Head: normocephalic and atraumatic  Eyes: pupils equal, round, sclerae anicteric, conjunctivae normal  ENT: no thrush or oral ulcers  Neck:No complaints, normal appearance  Pulmonary/Chest: Respirations easy at rest, no cough or respiratory distress  Cardiovascular: No chest pain, normal rate, toes warm, cap refill normal, PT and DP pulse audible with doppler  Abdomen: No nausea or vomiting  Extremities: no cyanosis, edema or cellulitis  Musculoskeletal: Ambulatory, moves all extremities, no deformities  Neurologic: distal sensation to light touch impaired with neuropathy, no allodynia.       Wound exam:    Wound location: Right Ankle, lateral   Length (cm) 2   Width (cm) 2.5   Depth (cm) 0.2   Tunneling 0   Undermining 0    Wound type:   Vascular/Neuropathic  Grade - stage - thickness: Full thickness     Description of periwound: Slight erythema    Description of wound bed: Wound with red granulation, biofilm and fibrin. Wound bed moist, moderate amount of serous drainage, edges open and attached. Surrounding tissue and ulcer without signs and symptoms of infection. No purulence, malodor, erythema, increased temperature, or increased pain.  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 Nonhealing. Debrided of fibrin and biofilm today. No signs of wound infection. Appetite is good. Protein supplements provided. Debridement is  indicated today, based on the history and exam above.  _______________________    Procedure:    Consent obtained. Time out performed per [facility]. Topical anesthetic applied: 4% topical lidocaine. Using a curette, I sharply debrided the right ankle ulcer(s) down through and including the removal of epidermis and dermis. The type(s) of tissue debrided included fibrin and biofilm. Total Surface Area Debrided: 5 sq cm. The ulcers were then irrigated with normal saline solution. The procedure was completed with a small amount of bleeding, and hemostasis was by pressure. The patient tolerated the procedure well, with no significant complications. The patient's level of pain during and after the procedure was monitored.  If post-debridement measurements are significantly different from initial measurements, those will be noted here.   _______________________    Recommendations:    - Dressings / Compression / Offloading: Change treatment to mupirocin, alginate AG and foam dressing.    - Labs / Diagnostic studies: None    - Medications / nutritional support: Stress tab with zinc and Prostat daily    - Further Consultations recommended: Vascular consult declined    - Anticipated follow-up: Weekly evaluation  _______________________    Electronically signed by DANIELLE Hernandez CNP on 4/5/2022 at 7:16 AM

## 2022-04-11 ENCOUNTER — OUTSIDE SERVICES (OUTPATIENT)
Dept: WOUND CARE | Age: 87
End: 2022-04-11
Payer: MEDICARE

## 2022-04-11 DIAGNOSIS — I70.233 ATHEROSCLEROSIS OF NATIVE ARTERY OF RIGHT LOWER EXTREMITY WITH ULCERATION OF ANKLE (HCC): ICD-10-CM

## 2022-04-11 DIAGNOSIS — G62.9 NEUROPATHY: ICD-10-CM

## 2022-04-11 PROCEDURE — 97597 DBRDMT OPN WND 1ST 20 CM/<: CPT | Performed by: CLINICAL NURSE SPECIALIST

## 2022-04-12 NOTE — PROGRESS NOTES
[address] [facility]    Patient name: Lidia Reed  :   7-  Facility:  [facility]  Place of Service: nursing facility (48)    Primary diagnosis for wound-care consultation: Vascular/Neuropathic ulcer right ankle, lateral    Additional ulcer(s) noted? None    History of Present Illness: Vascular/Neuropathic ulcer to right ankle, slow to heal. Punched out. Nonhealing. Debrided fibrin and biofilm today. No signs of wound infection. Appetite is good. Protein supplements provided. No fever or chills. Review of Systems: Pertinent systems reviewed in the HPI; all other systems reviewed, and negative. Pertinent elements of past medical, surgical, family, and/or social history: PVD, atherosclerotic heart disease, diabetes, essential hypertension, neuropathy, tobacco use and unspecified dementia. Medications and allergies are detailed in the nursing home chart, and were reviewed by me today.  _______________________    General Physical exam:    Vital signs:  135/71, 97.8, 64, 16, 96% room air    General Appearance: alert and oriented to person, place and time, forgetful, well developed and well-nourished, in no acute distress  Psychiatric:  Mood and affect appropriate for situation  Skin: warm and dry, no rash  Head: normocephalic and atraumatic  Eyes: pupils equal, round, sclerae anicteric, conjunctivae normal  ENT: no thrush or oral ulcers  Neck:No complaints, normal appearance  Pulmonary/Chest: Respirations easy at rest, no cough or respiratory distress  Cardiovascular: No chest pain, normal rate, toes warm, cap refill normal, PT and DP pulse audible with doppler  Abdomen: No nausea or vomiting  Extremities: no cyanosis, edema or cellulitis  Musculoskeletal: Ambulatory, moves all extremities, no deformities  Neurologic: distal sensation to light touch intact, no allodynia.       Wound exam:    Wound location: Right Ankle   Length (cm) 2.5   Width (cm) 2   Depth (cm) 0.2   Tunneling 0   Undermining 0    Wound type:   Vascular/Neuropathic  Grade - stage - thickness: Full thickness     Description of periwound: Slight erythema    Description of wound bed: Wound with red granulation, biofilm and fibrin. Wound bed moist, moderate amount of serous drainage, edges open and attached. Punched out in appearance. Surrounding tissue and ulcer without signs and symptoms of infection. No purulence, malodor, erythema, increased temperature, or increased pain.  _______________________    Recent labs and data reviewed: No new labs  _______________________     Jose Darnell diagnoses & assessment: Vascular/Neuropathic ulcer to right ankle, slow to heal. Punched out. Nonhealing. Debrided fibrin and biofilm today. No signs of wound infection. Appetite is good. Protein supplements provided. Debridement is  indicated today, based on the history and exam above.  _______________________    Procedure:    Consent obtained. Time out performed per [facility]. Topical anesthetic applied: 4% topical lidocaine. Using a curette, I sharply debrided the right ankle ulcer(s) down through and including the removal of epidermis and dermis. The type(s) of tissue debrided included fibrin and biofilm. Total Surface Area Debrided: 5 sq cm. The ulcers were then irrigated with normal saline solution. The procedure was completed with a small amount of bleeding, and hemostasis was by pressure. The patient tolerated the procedure well, with no significant complications. The patient's level of pain during and after the procedure was monitored.  If post-debridement measurements are significantly different from initial measurements, those will be noted here.   _______________________    Recommendations:    - Dressings / Compression / Offloading: Mupirocin, alginate ag and foam dressing    - Labs / Diagnostic studies: None    - Medications / nutritional support: Prostat and stress tab with zinc daily    - Further Consultations recommended: Declines vascular consult    - Anticipated follow-up: Weekly evaluation  _______________________    Electronically signed by DANIELLE Aragon CNP on 4/12/2022 at 10:22 AM

## 2022-04-18 ENCOUNTER — OUTSIDE SERVICES (OUTPATIENT)
Dept: WOUND CARE | Age: 87
End: 2022-04-18
Payer: MEDICARE

## 2022-04-18 DIAGNOSIS — I70.233 ATHEROSCLEROSIS OF NATIVE ARTERY OF RIGHT LOWER EXTREMITY WITH ULCERATION OF ANKLE (HCC): ICD-10-CM

## 2022-04-18 DIAGNOSIS — G62.9 NEUROPATHY: ICD-10-CM

## 2022-04-18 PROCEDURE — 97597 DBRDMT OPN WND 1ST 20 CM/<: CPT | Performed by: CLINICAL NURSE SPECIALIST

## 2022-04-18 NOTE — PROGRESS NOTES
[address] [facility]    Patient name: Sridhar Lopez  :   5-  Facility:  [facility]  Place of Service: nursing facility (55)    Primary diagnosis for wound-care consultation: Vascular/neuropathic ulcer to the right ankle, lateral.    Additional ulcer(s) noted? None    History of Present Illness: Vascular/neuropathic ulcer to right ankle, lateral.  Slow to heal.  Declines vascular consult. Debrided of fibrin and biofilm. Wound is wet. Slight maceration. There are 2 layers of drawtex. No signs of infection. Appetite is good. Protein supplements provided. No fever chills      Review of Systems: Pertinent systems reviewed in the HPI; all other systems reviewed, and negative. Pertinent elements of past medical, surgical, family, and/or social history: PVD, atherosclerotic heart disease, diabetes, essential hypertension, neuropathy, tobacco use and unspecified dementia.     Medications and allergies are detailed in the nursing home chart, and were reviewed by me today.  _______________________    General Physical exam:    Vital signs:  150/80, 97.8, 63, 20, 96% room air    General Appearance: alert and oriented to person, place and time, forgetful, well developed and well-nourished, in no acute distress  Psychiatric:  Mood and affect appropriate for situation  Skin: warm and dry, no rash  Head: normocephalic and atraumatic  Eyes: pupils equal, round, sclerae anicteric, conjunctivae normal  ENT: no thrush or oral ulcers  Neck:No complaints, normal appearance  Pulmonary/Chest: Respirations easy at rest, no cough or respiratory distress  Cardiovascular: No chest pain, normal rate, toes warm, cap refill normal, PT and DP pulses audible with Doppler  Abdomen: No nausea or vomiting  Extremities: no cyanosis, edema or cellulitis  Musculoskeletal: Ambulatory, moves all extremities, no deformities  Neurologic: distal sensation to light touch intact, no allodynia. Wound exam:    Wound location: Right Ankle   Length (cm) 1.9   Width (cm) 1.9   Depth (cm) 0.2   Tunneling 0   Undermining 0    Wound type:   Vascular/neuropathic  Grade - stage - thickness: Full thickness     Description of periwound: Slight erythema    Description of wound bed: Wound with red granulation, biofilm and fibrin. Slight maceration. Wound bed moist, moderate amount of serous drainage, edges open and attached. Surrounding tissue and ulcer without signs and symptoms of infection. No purulence, malodor, erythema, increased temperature, or increased pain.  _______________________    Recent labs and data reviewed: None  _______________________     Taylor Grijalva diagnoses & assessment: Vascular/neuropathic ulcer to right ankle, lateral.  Slow to heal.  Declines vascular consult. Debrided of fibrin and biofilm. Wound is wet. Slight maceration. There are 2 layers of drawtex. No signs of infection. Appetite is good. Protein supplements provided. Debridement is  indicated today, based on the history and exam above.  _______________________    Procedure:    Consent obtained. Time out performed per [facility]. Topical anesthetic applied: 4% topical lidocaine. Using a curette, I sharply debrided the right ankle ulcer(s) down through and including the removal of epidermis and dermis. The type(s) of tissue debrided included fibrin and biofilm. Total Surface Area Debrided: 3.61 sq cm. The ulcers were then irrigated with normal saline solution. The procedure was completed with a small amount of bleeding, and hemostasis was by pressure. The patient tolerated the procedure well, with no significant complications. The patient's level of pain during and after the procedure was monitored.  If post-debridement measurements are significantly different from initial measurements, those will be noted here.   _______________________    Recommendations:    - Dressings / Compression / Offloading: Change treatment to:  Mupirocin, alginate AG, 2 layers of drawtex then adhesive foam dressing.    - Labs / Diagnostic studies: None    - Medications / nutritional support: Prostat and stress tablet with zinc daily    - Further Consultations recommended: Declines vascular consult    - Anticipated follow-up: Weekly evaluation  _______________________    Electronically signed by DANIELLE Nicolas CNP on 4/18/2022 at 3:47 PM

## 2022-05-02 ENCOUNTER — OUTSIDE SERVICES (OUTPATIENT)
Dept: WOUND CARE | Age: 87
End: 2022-05-02
Payer: MEDICARE

## 2022-05-02 DIAGNOSIS — E11.622 DIABETIC ULCER OF LEFT ANKLE (HCC): ICD-10-CM

## 2022-05-02 DIAGNOSIS — I70.233 ATHEROSCLEROSIS OF NATIVE ARTERY OF RIGHT LOWER EXTREMITY WITH ULCERATION OF ANKLE (HCC): ICD-10-CM

## 2022-05-02 DIAGNOSIS — L97.329 DIABETIC ULCER OF LEFT ANKLE (HCC): ICD-10-CM

## 2022-05-02 PROCEDURE — 97597 DBRDMT OPN WND 1ST 20 CM/<: CPT | Performed by: CLINICAL NURSE SPECIALIST

## 2022-05-03 NOTE — PROGRESS NOTES
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 None    History of Present Illness: Vascular/neuropathic ulcer right ankle, lateral, slow to heal.  Debrided of fibrin and biofilm again today. Punched out in appearance. No signs of wound infection. Appetite is good. Protein supplements provided. Continues to smoke. Encouraged to cut back. No fever or chills. Recent changes in medications: Amaryl and Lexapro    Review of Systems: Pertinent systems reviewed in the HPI; all other systems reviewed, and negative. Pertinent elements of past medical, surgical, family, and/or social history: PVD, atherosclerotic heart disease, diabetes, essential hypertension, neuropathy, tobacco use and unspecified dementia.     Medications and allergies are detailed in the nursing home chart, and were reviewed by me today.  _______________________    General Physical exam:    Vital signs:  150/80, 97.5, 62, 20 96% RA    General Appearance: alert and oriented to person, place and time, forgetful, well developed and well-nourished, in no acute distress  Psychiatric:  Mood and affect appropriate for situation  Skin: warm and dry, no rash  Head: normocephalic and atraumatic  Eyes: pupils equal, round, sclerae anicteric, conjunctivae normal  ENT: no thrush or oral ulcers  Neck:No complaints, normal appearance  Pulmonary/Chest: Respirations easy at rest, no cough or respiratory distress  Cardiovascular: No chest pain, normal rate, toes warm, cap refill normal, PT and DP pulse audible with doppler  Abdomen: No nausea or vomiting  Extremities: no cyanosis, edema or cellulitis  Musculoskeletal: Ambulatory, moves all extremities, no deformities  Neurologic: distal sensation to light touch intact, no allodynia. Wound exam:    Wound location: Right Ankle   Length (cm) 1.8   Width (cm) 2.3   Depth (cm) 0.1   Tunneling 0   Undermining 0    Wound type:   Vascular/Neuropathic  Grade - stage - thickness: Full thickness     Description of periwound: Slight erythema    Description of wound bed: Wound with red granulation, biofilm and fibrin. Punched out. Wound bed moist, moderate amount of serous drainage, edges open and attached. Surrounding tissue and ulcer without signs and symptoms of infection. No purulence, malodor, erythema, increased temperature, or increased pain.  _______________________    Recent labs and data reviewed: No new labs  _______________________     Nighat Jovel diagnoses & assessment: Vascular/neuropathic ulcer right ankle, lateral, slow to heal.  Debrided of fibrin and biofilm again today. Punched out in appearance. No signs of wound infection. Appetite is good. Protein supplements provided. Continues to smoke. Encouraged to cut back. Debridement is  indicated today, based on the history and exam above.  _______________________    Procedure:    Consent obtained. Time out performed per [facility]. Topical anesthetic applied: 4% topical lidocaine. Using a curette, I sharply debrided the right ankle ulcer(s) down through and including the removal of epidermis and dermis. The type(s) of tissue debrided included fibrin and biofilm. Total Surface Area Debrided: 4.14 sq cm. The ulcers were then irrigated with normal saline solution. The procedure was completed with a small amount of bleeding, and hemostasis was by pressure. The patient tolerated the procedure well, with no significant complications. The patient's level of pain during and after the procedure was monitored.  If post-debridement measurements are significantly different from initial measurements, those will be noted here.   _______________________    Recommendations:    - Dressings / Compression / Offloading: Mupirocin, alginate AG, 2 layers of drawtex then adhesive foam dressing    - Labs / Diagnostic studies: None    - Medications / nutritional support: Prostat and stress tab wit zinc daily    - Further Consultations recommended: Declines vascular consult    - Anticipated follow-up: Weekly evaluation  _______________________    Electronically signed by DANIELLE Metz CNP on 5/3/2022 at 10:24 AM

## 2022-05-16 ENCOUNTER — OUTSIDE SERVICES (OUTPATIENT)
Dept: WOUND CARE | Age: 87
End: 2022-05-16
Payer: MEDICARE

## 2022-05-16 DIAGNOSIS — E11.622 DIABETIC ULCER OF ANKLE (HCC): ICD-10-CM

## 2022-05-16 DIAGNOSIS — L97.309 DIABETIC ULCER OF ANKLE (HCC): ICD-10-CM

## 2022-05-16 DIAGNOSIS — G62.9 NEUROPATHY: ICD-10-CM

## 2022-05-16 DIAGNOSIS — I70.233 ATHEROSCLEROSIS OF NATIVE ARTERY OF RIGHT LOWER EXTREMITY WITH ULCERATION OF ANKLE (HCC): ICD-10-CM

## 2022-05-16 PROCEDURE — 97597 DBRDMT OPN WND 1ST 20 CM/<: CPT | Performed by: CLINICAL NURSE SPECIALIST

## 2022-05-17 NOTE — PROGRESS NOTES
[facility]    Patient name: Libia An  :   1-  Facility:  [facility]  Place of Service: nursing facility (64)    Primary diagnosis for wound-care consultation: Vascular/neuropathic ulcer right ankle, lateral    Additional ulcer(s) noted? 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  Nonhealing. Debrided of biofilm today. Punched out in appearance. No signs of infection. Appetite is good. Protein supplements provided. Continues to smoke. He has been encouraged to cut back. No fever or chills. Review of Systems: Pertinent systems reviewed in the HPI; all other systems reviewed, and negative. Pertinent elements of past medical, surgical, family, and/or social history: PVD, atherosclerotic heart disease, diabetes, essential hypertension, neuropathy, tobacco use and unspecified dementia. Medications and allergies are detailed in the nursing home chart, and were reviewed by me today.  _______________________    General Physical exam:    Vital signs:  148/78, 97.5, 60, 18, 98%    General Appearance: alert and oriented to person, place and time, forgetful, well developed and well-nourished, in no acute distress  Psychiatric:  Mood and affect appropriate for situation  Skin: warm and dry, no rash  Head: normocephalic and atraumatic  Eyes: pupils equal, round, sclerae anicteric, conjunctivae normal  ENT: no thrush or oral ulcers  Neck:No complaints, normal appearance  Pulmonary/Chest: Respirations easy at rest, no cough or respiratory distress  Cardiovascular: No chest pain, normal rate, toes warm, cap refill normal, PT and DP pulse audible with Doppler  Abdomen: No nausea or vomiting  Extremities: no cyanosis, edema or cellulitis  Musculoskeletal: Ambulatory, moves all extremities, no deformities  Neurologic: distal sensation to light touch intact, no allodynia.       Wound exam:    Wound location: Right Ankle   Length (cm) 2   Width (cm) 2.5   Depth (cm) 0.2   Tunneling 0   Undermining 0    Wound type:   Vascular/Neuropathic/Diabetic  Grade - stage - thickness: Full thickness     Description of periwound: Slight erythema    Description of wound bed: Wound with red granulation and biofilm. Wound bed moist, moderate amount of serous drainage, edges open and attached. Punched out. Surrounding tissue and ulcer without signs and symptoms of infection. No purulence, malodor, erythema, increased temperature, or increased pain.  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  Nonhealing. Debrided of biofilm today. Punched out in appearance. No signs of infection. Appetite is good. Protein supplements provided. Continues to smoke. He has been encouraged to cut back. No fever or chills. Debridement is  indicated today, based on the history and exam above.  _______________________    Procedure:    Consent obtained. Time out performed per [facility]. Topical anesthetic applied: 4% topical lidocaine. Using a curette, I sharply debrided the right ankle ulcer(s) down through and including the removal of epidermis and dermis. The type(s) of tissue debrided included biofilm. Total Surface Area Debrided: 5 sq cm. The ulcers were then irrigated with normal saline solution. The procedure was completed with a small amount of bleeding, and hemostasis was by pressure. The patient tolerated the procedure well, with no significant complications. The patient's level of pain during and after the procedure was monitored.  If post-debridement measurements are significantly different from initial measurements, those will be noted here.   _______________________    Recommendations:    - Dressings / Compression / Offloading: Mupirocin, alginate AG, drawtex then foam dressing.    - Labs / Diagnostic studies: None    - Medications / nutritional support: Stress tab with zinc and Prostat daily    - Further Consultations recommended: None    - Anticipated follow-up: Weekly evaluation  _______________________    Electronically signed by DANIELLE Metz CNP on 5/17/2022 at 10:58 AM

## 2022-06-06 ENCOUNTER — OUTSIDE SERVICES (OUTPATIENT)
Dept: WOUND CARE | Age: 87
End: 2022-06-06
Payer: MEDICARE

## 2022-06-06 DIAGNOSIS — I70.233 ATHEROSCLEROSIS OF NATIVE ARTERY OF RIGHT LOWER EXTREMITY WITH ULCERATION OF ANKLE (HCC): ICD-10-CM

## 2022-06-06 DIAGNOSIS — G62.9 NEUROPATHY: ICD-10-CM

## 2022-06-06 DIAGNOSIS — E11.622 DIABETIC ULCER OF ANKLE (HCC): ICD-10-CM

## 2022-06-06 DIAGNOSIS — L97.309 DIABETIC ULCER OF ANKLE (HCC): ICD-10-CM

## 2022-06-06 PROCEDURE — 97597 DBRDMT OPN WND 1ST 20 CM/<: CPT | Performed by: CLINICAL NURSE SPECIALIST

## 2022-06-07 NOTE — PROGRESS NOTES
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    Additional ulcer(s) noted? 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  Nonhealing. Debrided of biofilm and fibrin today. Punched out in appearance. No signs of infection. Appetite is good. Protein supplements provided. Continues to smoke. No fever or chills. Review of Systems: Pertinent systems reviewed in the HPI; all other systems reviewed, and negative. Pertinent elements of past medical, surgical, family, and/or social history: PVD, atherosclerotic heart disease, diabetes, essential hypertension, neuropathy, tobacco use and unspecified dementia    Medications and allergies are detailed in the nursing home chart, and were reviewed by me today.  _______________________    General Physical exam:    Vital signs:  169/79, 97.5, 65, 18, 93%    General Appearance: alert and oriented to person, place and time, forgetful, well developed and well-nourished, in no acute distress  Psychiatric:  Mood and affect appropriate for situation  Skin: warm and dry, no rash  Head: normocephalic and atraumatic  Eyes: pupils equal, round, sclerae anicteric, conjunctivae normal  ENT: no thrush or oral ulcers  Neck:No complaints, normal appearance  Pulmonary/Chest: Respirations easy at rest, no cough or respiratory distress  Cardiovascular: No chest pain, normal rate, toes warm, cap refill normal, PT and DP pulse audible with Doppler  Abdomen: No nausea or vomiting  Extremities: no cyanosis, edema or cellulitis  Musculoskeletal: Ambulatory, moves all extremities, no deformities  Neurologic: distal sensation to light touch intact, no allodynia.       Wound exam:    Wound location: Right Ankle, lateral   Length (cm) 2   Width (cm) 2.5   Depth (cm) 0.2   Tunneling 0   Undermining 0    Wound type:   Vascular/Neuropathic/Diabetic  Grade - stage - thickness: Full thickness     Description of periwound: Slight erythema    Description of wound bed: Wound with red granulation, biofilm and fibrin. Wound bed moist, moderate amount of serous drainage, edges open and attached. Punched out in appearance. Surrounding tissue and ulcer without signs and symptoms of infection. No purulence, malodor, erythema, increased temperature, or increased pain.  _______________________    Recent labs and data reviewed: No new labs  _______________________     Laurita Palacios diagnoses & assessment: Vascular/neuropathic ulcer to right ankle, lateral.  Nonhealing. Debrided of biofilm and fibrin today. Punched out in appearance. No signs of infection. Appetite is good. Protein supplements provided. Continues to smoke    Debridement is  indicated today, based on the history and exam above.  _______________________    Procedure:    Consent obtained. Time out performed per [facility]. Topical anesthetic applied: 4% topical lidocaine. Using a curette, I sharply debrided the right ankle ulcer(s) down through and including the removal of epidermis and dermis. The type(s) of tissue debrided included fibrin and biofilm. Total Surface Area Debrided: 5 sq cm. The ulcers were then irrigated with normal saline solution. The procedure was completed with a small amount of bleeding, and hemostasis was by pressure. The patient tolerated the procedure well, with no significant complications. The patient's level of pain during and after the procedure was monitored.  If post-debridement measurements are significantly different from initial measurements, those will be noted here.   _______________________    Recommendations:    - Dressings / Compression / Offloading: Change treatment to alginate AG and adhesive foam.    - Labs / Diagnostic studies: None    - Medications / nutritional support: Prostat and stress tab with zinc daily    - Further Consultations recommended: None    - Anticipated follow-up: Weekly evaluation  _______________________    Electronically signed by DANIELLE Blanco CNP on 6/7/2022 at 10:23 AM

## 2022-06-24 LAB
T4 FREE: 0.9 NG/DL (ref 0.9–1.8)
TSH REFLEX: 5.22 UIU/ML (ref 0.27–4.2)

## 2022-07-11 ENCOUNTER — OUTSIDE SERVICES (OUTPATIENT)
Dept: WOUND CARE | Age: 87
End: 2022-07-11
Payer: MEDICARE

## 2022-07-11 DIAGNOSIS — I70.233 ATHEROSCLEROSIS OF NATIVE ARTERY OF RIGHT LOWER EXTREMITY WITH ULCERATION OF ANKLE (HCC): ICD-10-CM

## 2022-07-11 DIAGNOSIS — L97.319 DIABETIC ULCER OF RIGHT ANKLE (HCC): ICD-10-CM

## 2022-07-11 DIAGNOSIS — E11.622 DIABETIC ULCER OF RIGHT ANKLE (HCC): ICD-10-CM

## 2022-07-11 DIAGNOSIS — G62.9 NEUROPATHY: ICD-10-CM

## 2022-07-11 PROCEDURE — 97597 DBRDMT OPN WND 1ST 20 CM/<: CPT | Performed by: CLINICAL NURSE SPECIALIST

## 2022-07-12 NOTE — PROGRESS NOTES
88 [facility]    Patient name: Samantha Coe  :   3-  Facility:  [facility]  Place of Service: nursing facility (39)    Primary diagnosis for wound-care consultation: Vascular/Neuropathic/Diabetic ulcer to right ankle, lateral    Additional ulcer(s) noted? None    History of Present Illness: Vascular/Neuropathic/Diabetic ulcer to right ankle, lateral. Chronic wound. Smaller this week. Punched out in appearance. Debrided of fibrin and biofilm. No signs of infection. Protein supplement provided. Appetite is good. Continues to smoke. No fever or chills. Review of Systems: Pertinent systems reviewed in the HPI; all other systems reviewed, and negative. Pertinent elements of past medical, surgical, family, and/or social history: PVD, diabetes, atherosclerotic heart disease, essential hypertension, neuropathy, tobacco use and unspecified dementia    Medications and allergies are detailed in the nursing home chart, and were reviewed by me today.  _______________________    General Physical exam:    Vital signs:  153/82, 97.8, 64, 18, 97%    General Appearance: alert and oriented to person, place and time, forgetful, well developed and well-nourished, in no acute distress  Psychiatric:  Mood and affect appropriate for situation  Skin: warm and dry, no rash  Head: normocephalic and atraumatic  Eyes: pupils equal, round, sclerae anicteric, conjunctivae normal  ENT: no thrush or oral ulcers  Neck:No complaints, normal appearance  Pulmonary/Chest: Respirations easy at rest, no cough or respiratory distress  Cardiovascular: No chest pain, normal rate, toes warm, cap refill normal, PT and DP pulse audible with doppler  Abdomen: No nausea or vomiting  Extremities: no cyanosis, edema or cellulitis  Musculoskeletal: Ambulatory, moves all extremities, no deformities  Neurologic: distal sensation to light touch intact, no allodynia.       Wound exam:    Wound Verified Results  LIPID PNL [id number] 10:00AM RICHARD MCKNIGHT   [Feb 12, 2017 9:22PM RICHARD MCKNIGHT]  call the [facility] and find out who ordered this lipid profile. I don't see from previous lab work that I had ordered it??     Test Name Result Flag Reference   FASTING STATUS UNKNOWN hrs     CHOLESTEROL 182 mg/dl  <200   Desirable            <200  Borderline High      200 to 239  High                 >=240   LDL CHOLESTEROL (CALCULATED)   <130   UNABLE TO CALCULATE LDL. mg/dl   HDL CHOLESTEROL 33 mg/dl L >59   Low            <40  Borderline Low 40 to 49  Near Optimal   50 to 59  Optimal        >=60   TRIGLYCERIDES 418 mg/dl H <150   Normal                   <150  Borderline High          150 to 199  High                     200 to 499  Very High                >=500   NON-HDL CHOLESTEROL 149 mg/dl     Therapeutic Target:  CHD and risk equivalents <130  Multiple risk factors    <160  0 to 1 risk factors      <190   CHOLESTEROL/HDL RATIO 5.5 H <4.5        Offloading: Triad, alginate, drawtex and adhesive foam    - Labs / Diagnostic studies: None     - Medications / nutritional support: Prostat and stress tab with zinc daily    - Further Consultations recommended: None    - Anticipated follow-up: Weekly evaluation  _______________________    Electronically signed by DANIELLE Gómez CNP on 7/12/2022 at 10:23 AM

## 2022-08-08 ENCOUNTER — OUTSIDE SERVICES (OUTPATIENT)
Dept: WOUND CARE | Age: 87
End: 2022-08-08
Payer: MEDICARE

## 2022-08-08 DIAGNOSIS — L97.319 DIABETIC ULCER OF RIGHT ANKLE (HCC): Primary | ICD-10-CM

## 2022-08-08 DIAGNOSIS — G62.9 NEUROPATHY: ICD-10-CM

## 2022-08-08 DIAGNOSIS — I70.233 ATHEROSCLEROSIS OF NATIVE ARTERY OF RIGHT LOWER EXTREMITY WITH ULCERATION OF ANKLE (HCC): ICD-10-CM

## 2022-08-08 DIAGNOSIS — E11.622 DIABETIC ULCER OF RIGHT ANKLE (HCC): Primary | ICD-10-CM

## 2022-08-08 PROCEDURE — 97597 DBRDMT OPN WND 1ST 20 CM/<: CPT | Performed by: CLINICAL NURSE SPECIALIST

## 2022-08-10 NOTE — PROGRESS NOTES
[facility]  Extended [facility]    Patient name: Gail Carlson  :   7-  Facility:  [facility]  Place of Service: nursing facility (60)    Primary diagnosis for wound-care consultation: Vascular/Neuropathic/Diabetic ulcer to right ankle, lateral.    Additional ulcer(s) noted? None    History of Present Illness: Vascular/Neuropathic/Diabetic ulcer to right ankle, lateral.  Slow to heal. Chronic wound. Debridement of fibrin and biofilm today. Punched out in appearance. No signs of infection. To try to dry out wound, change dressing to: Betadine, alginate and foam dressing. Appetite is good. Protein supplement provided. Add Carlo. Obtain A1c if not recently obtained. New medication: Doxycycline. Recently moved to nursing home area from assisted living. Review of Systems: Pertinent systems reviewed in the HPI; all other systems reviewed, and negative. Pertinent elements of past medical, surgical, family, and/or social history: PVD, diabetes, atherosclerotic heart disease, essential hypertension, neuropathy, tobacco use and unspecified dementia.     Medications and allergies are detailed in the nursing home chart, and were reviewed by me today.  _______________________    General Physical exam:    Vital signs:  136/70, 98.2, 68, 18, 96%    General Appearance: alert and oriented to person, place and time, forgetful, well developed and well-nourished, in no acute distress  Psychiatric:  Mood and affect appropriate for situation  Skin: warm and dry, no rash  Head: normocephalic and atraumatic  Eyes: pupils equal, round, sclerae anicteric, conjunctivae normal  ENT: no thrush or oral ulcers  Neck:No complaints, normal appearance  Pulmonary/Chest: Respirations easy at rest, no cough or respiratory distress  Cardiovascular: No chest pain, normal rate, toes warm, cap refill normal  Abdomen: No nausea or vomiting  Extremities: no cyanosis, edema or cellulitis  Musculoskeletal: Ambulatory, moves all extremities, no deformities  Neurologic: distal sensation to light touch intact, no allodynia. Wound exam:    Wound location: Right Ankle, Lateral   Length (cm) 2   Width (cm) 2.4   Depth (cm) 0.3   Tunneling 0   Undermining 0    Wound type:   Vascular/neuropathic/diabetic  Grade - stage - thickness: Full thickness     Description of periwound: Erythema     Description of wound bed: Wound with red granulation, biofilm and fibrin. Wound bed moist, moderate amount of serous drainage, edges open and attached. Punched out in appearance surrounding tissue and ulcer without signs and symptoms of infection. No purulence, malodor, erythema, increased temperature, or increased pain.   _______________________    Recent labs and data reviewed: No new labs  _______________________     Mel Carrel diagnoses & assessment: Vascular/Neuropathic/Diabetic ulcer to right ankle, lateral.  Slow to heal. Chronic wound. Debridement of fibrin and biofilm today. Punched out in appearance. No signs of infection. Appetite is good. Protein supplement provided. Add Carlo. Obtain A1c if not recently obtained. New medication: Doxycycline. Debridement is  indicated today, based on the history and exam above.  _______________________    Procedure:    Consent obtained. Time out performed per [facility]. Topical anesthetic applied: 5% topical lidocaine. Using a curette, I sharply debrided the right ankle ulcer(s) down through and including the removal of epidermis and dermis. The type(s) of tissue debrided included fibrin and biofilm. Total Surface Area Debrided: 5 sq cm. The ulcers were then irrigated with normal saline solution. The procedure was completed with a small amount of bleeding, and hemostasis was by pressure. The patient tolerated the procedure well, with no significant complications.  The patient's level of pain during and after the procedure was monitored. If post-debridement measurements are significantly different from initial measurements, those will be noted here.   _______________________    Recommendations:    - Dressings / Compression / Offloading: Change treatment to Betadine, alginate and foam dressing daily.    - Labs / Diagnostic studies: A1c if not recently obtained    - Medications / nutritional support: And Carlo.   Prostat and stress tab with zinc daily.    - Further Consultations recommended: None    - Anticipated follow-up: Weekly evaluation  _______________________    Electronically signed by DANIELLE Mcclain CNP on 8/10/2022 at 11:00 AM

## 2022-08-15 ENCOUNTER — OUTSIDE SERVICES (OUTPATIENT)
Dept: WOUND CARE | Age: 87
End: 2022-08-15
Payer: MEDICARE

## 2022-08-15 DIAGNOSIS — L97.319 DIABETIC ULCER OF RIGHT ANKLE (HCC): ICD-10-CM

## 2022-08-15 DIAGNOSIS — I70.233 ATHEROSCLEROSIS OF NATIVE ARTERY OF RIGHT LOWER EXTREMITY WITH ULCERATION OF ANKLE (HCC): Primary | ICD-10-CM

## 2022-08-15 DIAGNOSIS — E11.622 DIABETIC ULCER OF RIGHT ANKLE (HCC): ICD-10-CM

## 2022-08-15 DIAGNOSIS — G62.9 NEUROPATHY: ICD-10-CM

## 2022-08-15 PROCEDURE — 97597 DBRDMT OPN WND 1ST 20 CM/<: CPT | Performed by: CLINICAL NURSE SPECIALIST

## 2022-08-18 NOTE — PROGRESS NOTES
88 [facility]    Patient name: Ariadna Chi  :   3-  Facility:  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    Additional ulcer(s) noted? 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 Slow to heal.  Slight improvement this week. Increased new epithelium around edges. Debrided of fibrin and biofilm today. Slightly hyper granulated. Silver nitrate applied as well. Punched out in appearance. No signs of wound infection. Appetite is good. Protein supplements are provided. No fever or chills. Review of Systems: Pertinent systems reviewed in the HPI; all other systems reviewed, and negative. Pertinent elements of past medical, surgical, family, and/or social history: PVD, diabetes, atherosclerotic heart disease, essential hypertension, neuropathy, tobacco use and unspecified dementia.     Medications and allergies are detailed in the nursing home chart, and were reviewed by me today.  _______________________    General Physical exam:    Vital signs:  155/90, 98, 71, 16, 97%    General Appearance: alert and oriented to person, place and time, forgetful, well developed and well-nourished, in no acute distress  Psychiatric:  Mood and affect appropriate for situation  Skin: warm and dry, no rash  Head: normocephalic and atraumatic  Eyes: pupils equal, round, sclerae anicteric, conjunctivae normal  ENT: no thrush or oral ulcers  Neck:No complaints, normal appearance  Pulmonary/Chest: Respirations easy at rest, no cough or respiratory distress  Cardiovascular: No chest pain, normal rate, toes warm, cap refill normal, PT and DP pulse audible with Doppler  Abdomen: No nausea or vomiting  Extremities: no cyanosis, edema or cellulitis  Musculoskeletal: Ambulatory, moves all extremities, no deformities  Neurologic: distal sensation to light touch intact, no allodynia. Wound exam:    Wound location: Right Ankle, lateral   Length (cm) 1.8   Width (cm) 1.4   Depth (cm) 0.2   Tunneling 0   Undermining 0    Wound type:   Vascular/Neuropathic/Diabetic  Grade - stage - thickness: Full thickness     Description of periwound: Erythema    Description of wound bed: Wound with red granulation, hypergranulation, biofilm and fibrin. Wound bed moist, moderate amount of serous drainage, edges open and attached. Punched out in appearance. Surrounding tissue and ulcer without signs and symptoms of infection. No purulence, malodor, erythema, increased temperature, or increased pain.   _______________________    Recent labs and data reviewed: 7/29/2022: A1c 6.1.  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 Slow to heal.  Slight improvement this week. Increased new epithelium around edges. Debrided of fibrin and biofilm today. Slightly hyper granulated. Silver nitrate applied as well. Punched out in appearance. No signs of wound infection. Appetite is good. Protein supplements are provided. Debridement is  indicated today, based on the history and exam above.  _______________________    Procedure:    Consent obtained. Time out performed per [facility]. Topical anesthetic applied: 4% topical lidocaine. Using a curette, I sharply debrided the right ankle ulcer(s) down through and including the removal of epidermis and dermis. The type(s) of tissue debrided included fibrin and biofilm. Total Surface Area Debrided: 2.5 sq cm. The ulcers were then irrigated with normal saline solution. The procedure was completed with a small amount of bleeding, and hemostasis was by pressure. The patient tolerated the procedure well, with no significant complications.  The patient's level of pain during and after the procedure was monitored. If post-debridement measurements are significantly different from initial measurements, those will be noted here.   _______________________    Secondary procedures performed: To encourage better epithelial cell coverage, I did use AgNO3 to chemically cauterize hypergranulation tissue on the right ankle ulcer(s), after application of 4% lidocaine topical solution. This was tolerated well, with no pain or skin injury.     _______________________    Recommendations:    - Dressings / Compression / Offloading: Betadine, alginate and adhesive foam dressing.    - Labs / Diagnostic studies: None    - Medications / nutritional support: Carlo, Prostat and stress tab with zinc daily    - Further Consultations recommended: None     - Anticipated follow-up: Weekly evaluation, nursing to follow  _______________________    Electronically signed by DANIELLE Camacho CNP on 8/18/2022 at 9:58 AM

## 2022-09-12 ENCOUNTER — OUTSIDE SERVICES (OUTPATIENT)
Dept: WOUND CARE | Age: 87
End: 2022-09-12
Payer: MEDICARE

## 2022-09-12 DIAGNOSIS — E11.622 DIABETIC ULCER OF RIGHT ANKLE (HCC): ICD-10-CM

## 2022-09-12 DIAGNOSIS — G62.9 NEUROPATHY: ICD-10-CM

## 2022-09-12 DIAGNOSIS — L97.319 DIABETIC ULCER OF RIGHT ANKLE (HCC): ICD-10-CM

## 2022-09-12 DIAGNOSIS — I70.233 ATHEROSCLEROSIS OF NATIVE ARTERY OF RIGHT LOWER EXTREMITY WITH ULCERATION OF ANKLE (HCC): Primary | ICD-10-CM

## 2022-09-12 PROCEDURE — 97597 DBRDMT OPN WND 1ST 20 CM/<: CPT | Performed by: CLINICAL NURSE SPECIALIST

## 2022-09-13 NOTE — PROGRESS NOTES
intact, no allodynia. Wound exam:    Wound location: Right Ankle, Lateral   Length (cm) 1.2   Width (cm) 1.5   Depth (cm) 0.2   Tunneling 0   Undermining 0    Wound type:   Vascular/Neuropathic/Diabetic  Grade - stage - thickness: Full thickness     Description of periwound: Dry skin    Description of wound bed: Wound with red granulation, biofilm and fibrin. Wound bed moist, moderate amount of serous drainage, edges open and attached. Surrounding tissue and ulcer without signs and symptoms of infection. No purulence, malodor, erythema, increased temperature, or increased pain.   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  Chronic wound. Continues to improve slightly. Increased granulation with increased new epithelium. Debrided of biofilm and fibrin today. No signs of wound infection. Appetite is good. Protein supplements provided. No fever or chills. Debridement is  indicated today, based on the history and exam above.  _______________________    Procedure:    Consent obtained. Time out performed per [facility]. Topical anesthetic applied: 5% topical lidocaine. Using a curette, I sharply debrided the right ankle ulcer(s) down through and including the removal of epidermis and dermis. The type(s) of tissue debrided included fibrin and biofilm. Total Surface Area Debrided: 1.8 sq cm. The ulcers were then irrigated with normal saline solution. The procedure was completed with a small amount of bleeding, and hemostasis was by pressure. The patient tolerated the procedure well, with no significant complications. The patient's level of pain during and after the procedure was monitored. If post-debridement measurements are significantly different from initial measurements, those will be noted here. _______________________    Recommendations:    - Dressings / Compression / Offloading: Change treatment to: 0.1% gentamicin with Aquaphor then alginate AG and adhesive foam dressing.     - Labs / Diagnostic studies: None    - Medications / nutritional support: Prostat, Carlo and Stress tab with zinc daily    - Further Consultations recommended: None    - Anticipated follow-up: Weekly evaluation  _______________________    Electronically signed by DANIELLE Choi CNP on 9/13/2022 at 11:55 AM

## 2022-09-19 ENCOUNTER — OUTSIDE SERVICES (OUTPATIENT)
Dept: WOUND CARE | Age: 87
End: 2022-09-19

## 2022-09-19 DIAGNOSIS — L97.319 DIABETIC ULCER OF RIGHT ANKLE (HCC): ICD-10-CM

## 2022-09-19 DIAGNOSIS — E11.622 DIABETIC ULCER OF RIGHT ANKLE (HCC): ICD-10-CM

## 2022-09-19 DIAGNOSIS — G62.9 NEUROPATHY: ICD-10-CM

## 2022-09-19 DIAGNOSIS — I70.233 ATHEROSCLEROSIS OF NATIVE ARTERY OF RIGHT LOWER EXTREMITY WITH ULCERATION OF ANKLE (HCC): Primary | ICD-10-CM

## 2022-09-20 NOTE — PROGRESS NOTES
[address]  Extended [facility]    Patient name: Mortimer Samuel  :   7-  Facility:  [facility]  Place of Service: nursing facility (01)    Primary diagnosis for wound-care consultation: Vascular/Neuropathic/Diabetic ulcer to right ankle, lateral.    Additional ulcer(s) noted? 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 Slow to heal.  Trying to dry out the wound with Betadine and alginate. No signs of infection. Appetite is good. Protein supplements provided. Continues to smoke a few cigarettes a day. No fever or chills. Review of Systems: Pertinent systems reviewed in the HPI; all other systems reviewed, and negative. Pertinent elements of past medical, surgical, family, and/or social history: PVD, diabetes, atherosclerotic heart disease, neuropathy, essential hypertension, tobacco use and unspecified dementia. Medications and allergies are detailed in the nursing home chart, and were reviewed by me today.  _______________________    General Physical exam:    Vital signs:  116/70, 97.5, 70, 16, 96%    General Appearance: alert and oriented to person, place and time, forgetful, well developed and well-nourished, in no acute distress  Psychiatric:  Mood and affect appropriate for situation  Skin: warm and dry, no rash  Head: normocephalic and atraumatic  Eyes: pupils equal, round, sclerae anicteric, conjunctivae normal  ENT: no thrush or oral ulcers  Neck:No complaints, normal appearance  Pulmonary/Chest: Respirations easy at rest, no cough or respiratory distress  Cardiovascular: No chest pain, normal rate, toes warm, cap refill normal, DP and PT pulse audible with Doppler  Abdomen: No nausea or vomiting  Extremities: no cyanosis, edema or cellulitis  Musculoskeletal: Ambulatory, moves all extremities, no deformities  Neurologic: distal sensation to light touch intact, no allodynia.        Wound exam:    Wound location: Right ankle, lateral   Length (cm) 1.5   Width (cm) 1.5   Depth (cm) 0.2   Tunneling 0   Undermining 0    Wound type:   Vascular/Diabetic/Neuropathic  Grade - stage - thickness: Full thickness     Description of periwound: Dry skin    Description of wound bed: Wound with pink, granulation and fibrin. Wound bed moist, moderate amount of serous drainage, edges attached. Surrounding tissue and ulcer without signs and symptoms of infection. No purulence, malodor, erythema, increased temperature, or increased pain.   _______________________    Recent labs and data reviewed: No new labs  _______________________     Louisa Cannon diagnoses & assessment: Vascular/Neuropathic/Diabetic ulcer to right ankle, lateral. Slow to heal.  Trying to dry out the wound with Betadine and alginate. No signs of infection. Appetite is good. Protein supplements provided. Continues to smoke a few cigarettes a day. Debridement is not indicated today, based on the history and exam above.  _______________________    Procedure:    Consent obtained. Time out performed per [facility].      _______________________    Recommendations:    - Dressings / Compression / Offloading: Betadine, alginate AG and adhesive foam    - Labs / Diagnostic studies: None    - Medications / nutritional support: Carlo, stress tab with zinc and Prostat daily    - Further Consultations recommended: None     - Anticipated follow-up: Weekly evaluation  _______________________    Electronically signed by DANIELLE Amin CNP on 9/20/2022 at 12:07 PM

## 2022-09-26 ENCOUNTER — OUTSIDE SERVICES (OUTPATIENT)
Dept: WOUND CARE | Age: 87
End: 2022-09-26

## 2022-09-26 DIAGNOSIS — I70.233 ATHEROSCLEROSIS OF NATIVE ARTERY OF RIGHT LOWER EXTREMITY WITH ULCERATION OF ANKLE (HCC): Primary | ICD-10-CM

## 2022-09-26 DIAGNOSIS — G62.9 NEUROPATHY: ICD-10-CM

## 2022-09-27 NOTE — PROGRESS NOTES
[facility]  Extended Care Program    Patient name: Luanne Dobbins  :   5-  Facility:  [facility]  Place of Service: nursing facility (10)    Primary diagnosis for wound-care consultation: Vascular/neuropathic/diabetic ulcer right ankle, lateral    Additional ulcer(s) noted? None    History of Present Illness: Vascular/neuropathic/diabetic ulcer right ankle, lateral. Slow to heal.  Continuing to dry out wound with Betadine and alginate. No signs of wound infection. Appetite is good. Protein supplements provided. Continues to smoke a few cigarettes a day. No fever or chills. Review of Systems: Pertinent systems reviewed in the HPI; all other systems reviewed, and negative. Pertinent elements of past medical, surgical, family, and/or social history: PVD, diabetes, atherosclerotic heart disease, neuropathy, essential hypertension, tobacco use and unspecified dementia. Medications and allergies are detailed in the nursing home chart, and were reviewed by me today.  _______________________    General Physical exam:    Vital signs:  161/86, 98.2, 90, 16, 97%    General Appearance: alert and oriented to person, place and time, forgetful, well developed and well-nourished, in no acute distress  Psychiatric:  Mood and affect appropriate for situation  Skin: warm and dry, no rash  Head: normocephalic and atraumatic  Eyes: pupils equal, round, sclerae anicteric, conjunctivae normal  ENT: no thrush or oral ulcers  Neck:No complaints, normal appearance  Pulmonary/Chest: Respirations easy at rest, no cough or respiratory distress  Cardiovascular: No chest pain, normal rate, toes warm, cap refill normal, nails are thick and dry, PT and DP pulse audible with Doppler  Abdomen: No nausea or vomiting  Extremities: no cyanosis, edema or cellulitis  Musculoskeletal: Ambulatory, moves all extremities, no deformities  Neurologic: distal sensation to light touch intact, no allodynia. Wound exam:    Wound location: Right Ankle, Lateral   Length (cm) 1.8   Width (cm) 1.5   Depth (cm) 0.1   Tunneling 0   Undermining 0    Wound type:   Vascular/diabetic/neuropathic  Grade - stage - thickness: Full thickness     Description of periwound: Dry skin    Description of wound bed: Wound with pink granulation and fibrin. Wound bed moist, moderate amount of serous drainage, edges open and attached. Punched out in appearance surrounding tissue and ulcer without signs and symptoms of infection. No purulence, malodor, erythema, increased temperature, or increased pain.   _______________________    Recent labs and data reviewed: No new labs  _______________________     Sulaiman Busch diagnoses & assessment: Vascular/neuropathic/diabetic ulcer right ankle, lateral. Slow to heal.  Continuing to dry out wound with Betadine and alginate. No signs of wound infection. Appetite is good. Protein supplements provided. Continues to smoke a few cigarettes a day. No fever or chills. Debridement is not indicated today, based on the history and exam above.  _______________________    Procedure:    Consent obtained. Time out performed per [facility].      Recommendations:    - Dressings / Compression / Offloading: Betadine, alginate AG and adhesive foam.    - Labs / Diagnostic studies: None    - Medications / nutritional support: Arginaid and Prostat daily    - Further Consultations recommended: None    - Anticipated follow-up: Weekly evaluation  _______________________    Electronically signed by DANIELLE Camacho CNP on 9/27/2022 at 12:21 PM

## 2022-10-03 ENCOUNTER — OUTSIDE SERVICES (OUTPATIENT)
Dept: WOUND CARE | Age: 87
End: 2022-10-03

## 2022-10-03 DIAGNOSIS — I70.233 ATHEROSCLEROSIS OF NATIVE ARTERY OF RIGHT LOWER EXTREMITY WITH ULCERATION OF ANKLE (HCC): Primary | ICD-10-CM

## 2022-10-03 DIAGNOSIS — E11.622 DIABETIC ULCER OF RIGHT ANKLE (HCC): ICD-10-CM

## 2022-10-03 DIAGNOSIS — G62.9 NEUROPATHY: ICD-10-CM

## 2022-10-03 DIAGNOSIS — L97.319 DIABETIC ULCER OF RIGHT ANKLE (HCC): ICD-10-CM

## 2022-10-04 NOTE — PROGRESS NOTES
[address]  Extended Care Program    Patient name: Keith Villa  :   1-  Facility:  [facility]  Place of Service: nursing facility (07)    Primary diagnosis for wound-care consultation: Vascular/Neuropathic/Diabetic ulcer to right ankle, lateral    Additional ulcer(s) noted? 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 Chronic wound. Slow to heal.  Smaller this week. Trying to dry out the wound with Betadine and alginate. No signs of wound infection. Appetite is good. Protein supplement. Continues to smoke a few cigarettes per day. No fever or chills    Review of Systems: Pertinent systems reviewed in the HPI; all other systems reviewed, and negative.     Pertinent elements of past medical, surgical, family, and/or social history: PVD, diabetes, atherosclerotic heart disease, neuropathy, essential hypertension, tobacco use and unspecified dementia    Medications and allergies are detailed in the nursing home chart, and were reviewed by me today.  _______________________    General Physical exam:    Vital signs:  159/92, 97.3, 68, 18, 96%    General Appearance: alert and oriented to person, place and time, forgetful, well developed and well-nourished, in no acute distress  Psychiatric:  Mood and affect appropriate for situation  Skin: warm and dry, no rash  Head: normocephalic and atraumatic  Eyes: pupils equal, round, sclerae anicteric, conjunctivae normal  ENT: no thrush or oral ulcers  Neck:No complaints, normal appearance  Pulmonary/Chest: Respirations easy at rest, no cough or respiratory distress  Cardiovascular: No chest pain, normal rate, toes warm, cap refill normal, PT and DP pulse audible with Doppler, nails are thick and dry  Abdomen: No nausea or vomiting  Extremities: no cyanosis, edema or cellulitis  Musculoskeletal: Ambulatory, moves all extremities, no deformities  Neurologic: distal sensation to light touch intact, no allodynia. Wound exam:    Wound location: Right Ankle, lateral   Length (cm) 1.3   Width (cm) 1.3   Depth (cm) 0.2   Tunneling 0   Undermining 0    Wound type:   Vascular/Diabetic/Neuropathic  Grade - stage - thickness: Full thickness     Description of periwound: Dry skin, slight erythema    Description of wound bed: Wound with pink and red granulation and fibrin. Wound bed moist, moderate amount of serous drainage, edges open and attached. Surrounding tissue and ulcer without signs and symptoms of infection. No purulence, malodor, erythema, increased temperature, or increased pain.   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 Chronic wound. Slow to heal.  Smaller this week. Trying to dry out the wound with Betadine and alginate. No signs of wound infection. Appetite is good. Protein supplement. Continues to smoke a few cigarettes per day. No fever or chills    Debridement is not indicated today, based on the history and exam above.  _______________________    Procedure:    Consent obtained. Time out performed per [facility].      _______________________    Recommendations:    - Dressings / Compression / Offloading: Betadine, alginate AG and adhesive foam    - Labs / Diagnostic studies: None    - Medications / nutritional support: Alginate and Prostat daily    - Further Consultations recommended: None    - Anticipated follow-up: Weekly evaluation  _______________________    Electronically signed by DANIELLE Cruz CNP on 10/4/2022 at 1:07 PM

## 2022-10-10 ENCOUNTER — OUTSIDE SERVICES (OUTPATIENT)
Dept: WOUND CARE | Age: 87
End: 2022-10-10

## 2022-10-10 DIAGNOSIS — G62.9 NEUROPATHY: ICD-10-CM

## 2022-10-10 DIAGNOSIS — I70.233 ATHEROSCLEROSIS OF NATIVE ARTERY OF RIGHT LOWER EXTREMITY WITH ULCERATION OF ANKLE (HCC): Primary | ICD-10-CM

## 2022-10-10 DIAGNOSIS — L97.319 DIABETIC ULCER OF RIGHT ANKLE (HCC): ICD-10-CM

## 2022-10-10 DIAGNOSIS — E11.622 DIABETIC ULCER OF RIGHT ANKLE (HCC): ICD-10-CM

## 2022-10-11 NOTE — PROGRESS NOTES
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    Additional ulcer(s) noted? 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 Slow to heal.  Trying to dry the wound out. Again, smaller this week. No signs of wound infection. Appetite is good. Protein supplements are provided. Continues to smoke a few cigarettes a day. No fever or chills. New medication: Aricept. Review of Systems: Pertinent systems reviewed in the HPI; all other systems reviewed, and negative. Pertinent elements of past medical, surgical, family, and/or social history: PVD, diabetes, atherosclerotic heart disease, neuropathy, essential hypertension, tobacco use and unspecified dementia.     Medications and allergies are detailed in the nursing home chart, and were reviewed by me today.  _______________________    General Physical exam:    Vital signs:  159/92, 97.3, 60, 18, 96%    General Appearance: alert and oriented to person, place and time, forgetful, well developed and well-nourished, in no acute distress  Psychiatric:  Mood and affect appropriate for situation  Skin: warm and dry, no rash  Head: normocephalic and atraumatic  Eyes: pupils equal, round, sclerae anicteric, conjunctivae normal  ENT: no thrush or oral ulcers  Neck:No complaints, normal appearance  Pulmonary/Chest: Respirations easy at rest, no cough or respiratory distress  Cardiovascular: No chest pain, normal rate, toes warm, cap refill normal, dry thick nails, no hair growth on legs, PT and DP pulse audible with Doppler  Abdomen: No nausea or vomiting  Extremities: no cyanosis, edema or cellulitis  Musculoskeletal: Ambulatory, moves all extremities, no deformities  Neurologic: distal sensation to light touch intact, no allodynia. Wound exam:    Wound location: Right Ankle, Lateral   Length (cm) 1   Width (cm) 0.9   Depth (cm) 0.1   Tunneling 0   Undermining 0    Wound type:   Vascular/diabetic/neuropathic  Grade - stage - thickness: Full thickness     Description of periwound: Dry skin, slight erythema    Description of wound bed: Wound with red granulation and fibrin. Wound bed moist, moderate amount of serous drainage, edges open and attached. Surrounding tissue and ulcer without signs and symptoms of infection. No purulence, malodor, erythema, increased temperature, or increased pain.   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 Slow to heal.  Trying to dry the wound out. Again, smaller this week. No signs of wound infection. Appetite is good. Protein supplements are provided. Continues to smoke a few cigarettes a day. No fever or chills. Debridement is not indicated today, based on the history and exam above.  _______________________    Procedure:    Consent obtained. Time out performed per [facility].      _______________________    Recommendations:    - Dressings / Compression / Offloading: Betadine, alginate AG and adhesive foam.    - Labs / Diagnostic studies: None    - Medications / nutritional support: Alginaid and Prostat daily    - Further Consultations recommended: None     - Anticipated follow-up: Weekly evaluation  _______________________    Electronically signed by DANIELLE Moran CNP on 10/11/2022 at 10:25 AM

## 2022-10-17 ENCOUNTER — OUTSIDE SERVICES (OUTPATIENT)
Dept: WOUND CARE | Age: 87
End: 2022-10-17

## 2022-10-17 DIAGNOSIS — I70.233 ATHEROSCLEROSIS OF NATIVE ARTERY OF RIGHT LOWER EXTREMITY WITH ULCERATION OF ANKLE (HCC): Primary | ICD-10-CM

## 2022-10-17 DIAGNOSIS — L97.319 DIABETIC ULCER OF RIGHT ANKLE (HCC): ICD-10-CM

## 2022-10-17 DIAGNOSIS — E11.622 DIABETIC ULCER OF RIGHT ANKLE (HCC): ICD-10-CM

## 2022-10-17 DIAGNOSIS — G62.9 NEUROPATHY: ICD-10-CM

## 2022-10-18 NOTE — PROGRESS NOTES
[address] [facility]    Patient name: Paco Humphrey  :   4-  Facility:  [facility]  Place of Service: nursing facility (12)    Primary diagnosis for wound-care consultation: Vascular/Neuropathic/Diabetic ulcer to right ankle, lateral    Additional ulcer(s) noted? 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 Nonhealing. No significant changes week. No signs of wound infection. Appetite is good. Protein supplement provided. Continue to try to dry out wound. Continues to smoke a few cigarettes a day. No fever or chills. Review of Systems: Pertinent systems reviewed in the HPI; all other systems reviewed, and negative.     Pertinent elements of past medical, surgical, family, and/or social history: PVD, diabetes, atherosclerotic heart disease, neuropathy, essential hypertension, tobacco use and unspecified dementia    Medications and allergies are detailed in the nursing home chart, and were reviewed by me today.  _______________________    General Physical exam:    Vital signs:  159/92, 98.2, 68, 16, 96%    General Appearance: alert and oriented to person, place and time, forgetful, well developed and well-nourished, in no acute distress  Psychiatric:  Mood and affect appropriate for situation  Skin: warm and dry, no rash  Head: normocephalic and atraumatic  Eyes: pupils equal, round, sclerae anicteric, conjunctivae normal  ENT: no thrush or oral ulcers  Neck:No complaints, normal appearance  Pulmonary/Chest: Respirations easy at rest, no cough or respiratory distress  Cardiovascular: No chest pain, normal rate, toes warm, cap refill normal, PT and DP pulse audible with Doppler, no hair growth on legs, nails are thick and dry  Abdomen: No nausea or vomiting  Extremities: no cyanosis, edema or cellulitis  Musculoskeletal: Ambulatory, moves all extremities, no deformities  Neurologic: distal sensation to light touch intact, no allodynia. Wound exam:    Wound location: Right Ankle, Lateral   Length (cm) 1.2   Width (cm) 1.2   Depth (cm) 0.2   Tunneling 0   Undermining 0    Wound type:   Vascular/Neuropathic/Diabetic  Grade - stage - thickness: Full thickness     Description of periwound: Dry skin    Description of wound bed: Wound with pink and red granulation and biofilm. Wound bed moist, moderate amount of serous drainage, edges open and attached. Surrounding tissue and ulcer without signs and symptoms of infection. No purulence, malodor, erythema, increased temperature, or increased pain.   _______________________    Recent labs and data reviewed: No new labs  _______________________     Lv Jan diagnoses & assessment: Vascular/Neuropathic/Diabetic ulcer to right ankle, lateral. Nonhealing. No significant changes week. No signs of wound infection. Appetite is good. Protein supplement provided. Continue to try to dry out wound. Continues to smoke a few cigarettes a day. Debridement is not indicated today, based on the history and exam above.  _______________________    Procedure:    Consent obtained. Time out performed per [facility].      _______________________    Recommendations:    - Dressings / Compression / Offloading: Betadine, alginate AG and adhesive foam    - Labs / Diagnostic studies: None    - Medications / nutritional support: Alginate and Prostat daily.    - Further Consultations recommended: None    - Anticipated follow-up: Weekly evaluation  _______________________    Electronically signed by DANIELLE Morales CNP on 10/18/2022 at 11:58 AM

## 2022-10-24 ENCOUNTER — OUTSIDE SERVICES (OUTPATIENT)
Dept: WOUND CARE | Age: 87
End: 2022-10-24
Payer: MEDICARE

## 2022-10-24 DIAGNOSIS — L98.491 NEUROPATHIC ULCER, LIMITED TO BREAKDOWN OF SKIN (HCC): ICD-10-CM

## 2022-10-24 DIAGNOSIS — L97.319 DIABETIC ULCER OF RIGHT ANKLE (HCC): ICD-10-CM

## 2022-10-24 DIAGNOSIS — I70.233 ATHEROSCLEROSIS OF NATIVE ARTERY OF RIGHT LOWER EXTREMITY WITH ULCERATION OF ANKLE (HCC): Primary | ICD-10-CM

## 2022-10-24 DIAGNOSIS — E11.622 DIABETIC ULCER OF RIGHT ANKLE (HCC): ICD-10-CM

## 2022-10-24 PROCEDURE — 99308 SBSQ NF CARE LOW MDM 20: CPT | Performed by: CLINICAL NURSE SPECIALIST

## 2022-10-25 NOTE — PROGRESS NOTES
[address] [facility]    Patient name: Yazmin Fofana  :   9-  Facility:  [facility]  Place of Service: nursing facility (53)    Primary diagnosis for wound-care consultation: Vascular/neuropathic/diabetic ulcer to right ankle    Additional ulcer(s) noted? None    History of Present Illness: Vascular/neuropathic/diabetic ulcer to right ankle. Lateral.  Chronic wound. No signs of wound infection. No debridement today. Trying to dry wound out. Appetite is good. Protein supplements provided. No fever or chills. Review of Systems: Pertinent systems reviewed in the HPI; all other systems reviewed, and negative. Pertinent elements of past medical, surgical, family, and/or social history: PVD, diabetes, atherosclerotic heart disease, neuropathy, essential hypertension, tobacco use and unspecified dementia. Medications and allergies are detailed in the nursing home chart, and were reviewed by me today.  _______________________    General Physical exam:    Vital signs:  159/92, 98.2, 68, 18, 96% room air    General Appearance: alert and oriented to person, place and time, forgetful, well developed and well-nourished, in no acute distress  Psychiatric:  Mood and affect appropriate for situation  Skin: warm and dry, no rash  Head: normocephalic and atraumatic  Eyes: pupils equal, round, sclerae anicteric, conjunctivae normal  ENT: no thrush or oral ulcers  Neck:No complaints, normal appearance  Pulmonary/Chest: Respirations easy at rest, no cough or respiratory distress  Cardiovascular: No chest pain, normal rate, toes warm, cap refill normal, DP and PT pulses audible with Doppler  Abdomen: No nausea or vomiting  Extremities: no cyanosis, edema or cellulitis  Musculoskeletal: Ambulatory, moves all extremities, no deformities  Neurologic: distal sensation to light touch intact, no allodynia.        Wound exam:    Wound location: Right ankle   Length (cm) 1.2   Width (cm) 1.2   Depth (cm) 0.2   Tunneling 0   Undermining 0    Wound type:   Vascular/neuropathic/diabetic  Grade - stage - thickness: Full thickness     Description of periwound: Slight erythema    Description of wound bed: Wound with red granulation and biofilm. Wound bed moist, moderate amount of serous drainage, edges open and attached. Surrounding tissue and ulcer without signs and symptoms of infection. No purulence, malodor, erythema, increased temperature, or increased pain.   _______________________    Recent labs and data reviewed: No new labs  _______________________     Awa Ray diagnoses & assessment: Vascular/neuropathic/diabetic ulcer to right ankle. Lateral.  Chronic wound. No signs of wound infection. No debridement today. Trying to dry wound out. Appetite is good. Protein supplements provided. Debridement is not indicated today, based on the history and exam above.  _______________________    Procedure:    Consent obtained. Time out performed per [facility].      _______________________    Recommendations:    - Dressings / Compression / Offloading: Betadine, alginate AG and adhesive foam.    - Labs / Diagnostic studies: None    - Medications / nutritional support: Arginine and Protostat daily    - Further Consultations recommended: None    - Anticipated follow-up: Weekly evaluation  _______________________    Electronically signed by DANIELLE Lucas CNP on 10/25/2022 at 10:22 AM

## 2022-11-07 ENCOUNTER — OUTSIDE SERVICES (OUTPATIENT)
Dept: WOUND CARE | Age: 87
End: 2022-11-07
Payer: MEDICARE

## 2022-11-07 DIAGNOSIS — E11.622 DIABETIC ULCER OF ANKLE (HCC): ICD-10-CM

## 2022-11-07 DIAGNOSIS — L98.491 NEUROPATHIC ULCER, LIMITED TO BREAKDOWN OF SKIN (HCC): ICD-10-CM

## 2022-11-07 DIAGNOSIS — L97.309 DIABETIC ULCER OF ANKLE (HCC): ICD-10-CM

## 2022-11-07 DIAGNOSIS — I70.233 ATHEROSCLEROSIS OF NATIVE ARTERY OF RIGHT LOWER EXTREMITY WITH ULCERATION OF ANKLE (HCC): Primary | ICD-10-CM

## 2022-11-07 PROCEDURE — 99441 PR PHYS/QHP TELEPHONE EVALUATION 5-10 MIN: CPT | Performed by: CLINICAL NURSE SPECIALIST

## 2022-11-07 NOTE — PROGRESS NOTES
today.  _______________________    General Physical exam:    Vital signs:  142/77, 98.2, 82, 18, 97%    Virtual visit. Wound exam:    Wound location: Right Ankle, Lateral   Length (cm) 1.2   Width (cm) 1.2   Depth (cm) 0.2   Tunneling 0   Undermining 0    Wound type:   Vascular/Neuropathic/Diabetic  Grade - stage - thickness: Full thickness     Description of periwound: Intact     Description of wound bed: Wound with red granulation and biofilm. Wound bed moist, moderate amount of serous drainage, edges open and attached. Surrounding tissue and ulcer without signs and symptoms of infection. No purulence, malodor, erythema, increased temperature, or increased pain.   _______________________    Recent labs and data reviewed: No new labs  _______________________     Nneka Navarrete diagnoses & assessment: Vascular/Neuropathic/Diabetic ulcer to right ankle. Chronic wound. No signs of wound infection wound stable. No changes this week. Appetite is good. Protein supplements provided. Debridement is not indicated today, based on the history and exam above. Virtual visit.  _______________________    Procedure:    Consent obtained. Time out performed per [facility].      _______________________    Recommendations:    - Dressings / Compression / Offloading: Betadine, alginate AG and adhesive foam    - Labs / Diagnostic studies: None    - Medications / nutritional support: Arginine and Prostat daily    - Further Consultations recommended: None     - Anticipated follow-up: Weekly evaluation  _______________________    Electronically signed by DANIELLE Edwards CNP on 11/7/2022 at 2:51 PM

## 2022-12-12 ENCOUNTER — OUTSIDE SERVICES (OUTPATIENT)
Dept: WOUND CARE | Age: 87
End: 2022-12-12

## 2022-12-12 DIAGNOSIS — E11.622 DIABETIC ULCER OF RIGHT ANKLE (HCC): Primary | ICD-10-CM

## 2022-12-12 DIAGNOSIS — I70.233 ATHEROSCLEROSIS OF NATIVE ARTERY OF RIGHT LOWER EXTREMITY WITH ULCERATION OF ANKLE (HCC): ICD-10-CM

## 2022-12-12 DIAGNOSIS — L98.492 NEUROPATHIC ULCER WITH FAT LAYER EXPOSED (HCC): ICD-10-CM

## 2022-12-12 DIAGNOSIS — L97.319 DIABETIC ULCER OF RIGHT ANKLE (HCC): Primary | ICD-10-CM

## 2022-12-13 NOTE — PROGRESS NOTES
[address] [facility]  Extended Care Program    Patient name: Laron Ceron  :   1-  Facility:  [facility]  Place of Service: nursing facility (46)    Virtual Visit: Sedonia Baumgarten is a 80 y.o. male evaluated via telephone on 2022 for Wound Check  . Documentation:  I communicated with the patient and/or health care decision maker about wound care. Details of this discussion including any medical advice provided: As detailed below    Total Time: minutes: 5-10 minutes    Sedonia Baumgarten was evaluated through a virtual visit. Patient identification was verified at the start of the visit. He (or guardian if applicable) is aware that this is a billable service, which includes applicable co-pays. This visit was conducted with the patient's (and/or legal guardian's) verbal consent. He has not had a related appointment within my department in the past 7 days or scheduled within the next 24 hours. The patient was located at [facility]. The provider was located at [address]: [place]. Note: not billable if this call serves to triage the patient into an appointment for the relevant concern    DANIELLE Edwards CNP    Primary diagnosis for wound-care consultation: Vascular/neuropathic/diabetic ulcer to right ankle    Additional ulcer(s) noted? None    History of Present Illness: Vascular/neuropathic/diabetic ulcer to right ankle. Slow to heal.  Nearly resolved today. Appetite is good. Protein supplements are provided. No fever or chills. New medication: Aricept    Review of Systems: Pertinent systems reviewed in the HPI; all other systems reviewed, and negative. Pertinent elements of past medical, surgical, family, and/or social history: PVD, diabetes, atherosclerotic heart disease, neuropathy, essential hypertension, tobacco use and unspecified dementia.     Medications and allergies are detailed in the nursing home chart, and were reviewed by me today.  _______________________    General Physical exam:    Vital signs:  147/79, 98.2, 71, 16, 97%    Wound exam:    Wound location: Right Ankle, Lateral   Length (cm) 0.1   Width (cm) 0.1   Depth (cm) 0.1   Tunneling 0   Undermining 0    Wound type:   Vascular/neuropathic/diabetic  Grade - stage - thickness: Full thickness     Description of periwound: Dry skin    Description of wound bed: Wound with red granulation. Wound bed moist, moderate amount of serous drainage, edges open and attached. Surrounding tissue and ulcer without signs and symptoms of infection. No purulence, malodor, erythema, increased temperature, or increased pain.   _______________________    Recent labs and data reviewed: No new labs  _______________________     Uyen Rutledge diagnoses & assessment: Vascular/neuropathic/diabetic ulcer to right ankle. Slow to heal.  Nearly resolved today. Appetite is good. Protein supplements are provided. No fever or chills. _______________________    Procedure:    Consent obtained. Time out performed per [facility].      Virtual Visit  _______________________    Recommendations:    - Dressings / Compression / Offloading: Betadine, alginate AG and adhesive foam    - Labs / Diagnostic studies: None    - Medications / nutritional support: Arginine and Prostat daily    - Further Consultations recommended: None    - Anticipated follow-up: Weekly evaluation   _______________________    Electronically signed by Corrinne Skeans, APRN - CNP on 12/13/2022 at 11:01 AM

## 2023-07-07 ENCOUNTER — HOSPITAL ENCOUNTER (EMERGENCY)
Age: 88
Discharge: HOME | End: 2023-07-08
Payer: MEDICARE

## 2023-07-07 ENCOUNTER — APPOINTMENT (OUTPATIENT)
Dept: CT IMAGING | Age: 88
End: 2023-07-07
Payer: MEDICARE
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
## 2023-07-07 DIAGNOSIS — S32.030A COMPRESSION FRACTURE OF L3 LUMBAR VERTEBRA, CLOSED, INITIAL ENCOUNTER (HCC): ICD-10-CM

## 2023-07-07 PROCEDURE — 6370000000 HC RX 637 (ALT 250 FOR IP): Performed by: REGISTERED NURSE

## 2023-07-07 PROCEDURE — 70450 CT HEAD/BRAIN W/O DYE: CPT

## 2023-07-07 PROCEDURE — 72131 CT LUMBAR SPINE W/O DYE: CPT

## 2023-07-07 PROCEDURE — 72125 CT NECK SPINE W/O DYE: CPT

## 2023-07-07 PROCEDURE — 99284 EMERGENCY DEPT VISIT MOD MDM: CPT

## 2023-07-07 PROCEDURE — 72192 CT PELVIS W/O DYE: CPT

## 2023-07-07 RX ORDER — LIDOCAINE 4 G/G
1 PATCH TOPICAL DAILY
Qty: 30 PATCH | Refills: 0 | Status: SHIPPED | OUTPATIENT
Start: 2023-07-07 | End: 2023-08-06

## 2023-07-07 RX ORDER — LIDOCAINE 4 G/G
2 PATCH TOPICAL ONCE
Status: DISCONTINUED | OUTPATIENT
Start: 2023-07-07 | End: 2023-07-08 | Stop reason: HOSPADM

## 2023-07-07 RX ORDER — HYDROCODONE BITARTRATE AND ACETAMINOPHEN 5; 325 MG/1; MG/1
1 TABLET ORAL EVERY 6 HOURS PRN
Status: DISCONTINUED | OUTPATIENT
Start: 2023-07-07 | End: 2023-07-08 | Stop reason: HOSPADM

## 2023-07-07 RX ADMIN — HYDROCODONE BITARTRATE AND ACETAMINOPHEN 1 TABLET: 5; 325 TABLET ORAL at 19:10

## 2023-07-07 ASSESSMENT — PAIN DESCRIPTION - DESCRIPTORS
DESCRIPTORS: ACHING
DESCRIPTORS: ACHING

## 2023-07-07 ASSESSMENT — ENCOUNTER SYMPTOMS
BACK PAIN: 1
SHORTNESS OF BREATH: 0
CONSTIPATION: 0
VOMITING: 0
COUGH: 0
NAUSEA: 0
CHEST TIGHTNESS: 0
RHINORRHEA: 0
DIARRHEA: 0
ABDOMINAL PAIN: 0
SORE THROAT: 0

## 2023-07-07 ASSESSMENT — PAIN DESCRIPTION - LOCATION
LOCATION: BACK
LOCATION: BACK

## 2023-07-07 ASSESSMENT — PAIN SCALES - GENERAL
PAINLEVEL_OUTOF10: 6
PAINLEVEL_OUTOF10: 5

## 2023-07-07 ASSESSMENT — PAIN DESCRIPTION - PAIN TYPE: TYPE: ACUTE PAIN

## 2023-07-07 ASSESSMENT — PAIN - FUNCTIONAL ASSESSMENT: PAIN_FUNCTIONAL_ASSESSMENT: 0-10

## 2023-07-07 ASSESSMENT — PAIN DESCRIPTION - ORIENTATION
ORIENTATION: LOWER
ORIENTATION: LOWER

## 2023-07-08 VITALS
OXYGEN SATURATION: 92 % | WEIGHT: 176 LBS | HEIGHT: 72 IN | DIASTOLIC BLOOD PRESSURE: 82 MMHG | HEART RATE: 62 BPM | RESPIRATION RATE: 16 BRPM | TEMPERATURE: 97.7 F | SYSTOLIC BLOOD PRESSURE: 148 MMHG | BODY MASS INDEX: 23.84 KG/M2

## 2023-07-08 NOTE — DISCHARGE INSTRUCTIONS
I did speak with neurosurgery while you are in the department this evening. They did not commend any bracing. They did instruct that if you are still having pain in 2 weeks to follow-up, you were given their contact information. Alternate Tylenol and ibuprofen as needed for pain as well as apply lidocaine patches.

## 2023-07-08 NOTE — ED NOTES
@ [phone] placed call to the transfer center to [facility] neurosurgery  @ [id number] neurosurgery called back     Juliana Hickman  07/07/23 [id number]

## 2023-10-03 PROCEDURE — 83735 ASSAY OF MAGNESIUM: CPT

## 2023-10-03 PROCEDURE — 36415 COLL VENOUS BLD VENIPUNCTURE: CPT

## 2023-10-03 PROCEDURE — 84100 ASSAY OF PHOSPHORUS: CPT

## 2023-10-04 ENCOUNTER — HOSPITAL ENCOUNTER (INPATIENT)
Age: 88
LOS: 3 days | Discharge: NURSING, REHAB OR LONG-TERM CARE | DRG: 177 | End: 2023-10-07
Attending: EMERGENCY MEDICINE | Admitting: INTERNAL MEDICINE
Payer: MEDICARE

## 2023-10-04 ENCOUNTER — APPOINTMENT (OUTPATIENT)
Dept: CT IMAGING | Age: 88
DRG: 177 | End: 2023-10-04
Payer: MEDICARE

## 2023-10-04 ENCOUNTER — APPOINTMENT (OUTPATIENT)
Dept: GENERAL RADIOLOGY | Age: 88
DRG: 177 | End: 2023-10-04
Payer: MEDICARE

## 2023-10-04 DIAGNOSIS — G89.29 CHRONIC IDIOPATHIC PAIN SYNDROME: ICD-10-CM

## 2023-10-04 DIAGNOSIS — I45.10 RBBB: ICD-10-CM

## 2023-10-04 DIAGNOSIS — R09.02 HYPOXIA: ICD-10-CM

## 2023-10-04 DIAGNOSIS — R41.82 ALTERED MENTAL STATUS, UNSPECIFIED ALTERED MENTAL STATUS TYPE: ICD-10-CM

## 2023-10-04 DIAGNOSIS — J18.9 PNEUMONIA OF LEFT LOWER LOBE DUE TO INFECTIOUS ORGANISM: Primary | ICD-10-CM

## 2023-10-04 DIAGNOSIS — R53.83 OTHER FATIGUE: ICD-10-CM

## 2023-10-04 PROBLEM — J15.9 PNEUMONIA, BACTERIAL: Status: ACTIVE | Noted: 2023-10-04

## 2023-10-04 LAB
ALBUMIN SERPL-MCNC: 3.8 G/DL (ref 3.4–5)
ALBUMIN/GLOB SERPL: 1.4 {RATIO} (ref 1.1–2.2)
ALP SERPL-CCNC: 86 U/L (ref 40–129)
ALT SERPL-CCNC: 6 U/L (ref 10–40)
AMORPH SED URNS QL MICRO: ABNORMAL /HPF
ANION GAP SERPL CALCULATED.3IONS-SCNC: 8 MMOL/L (ref 3–16)
AST SERPL-CCNC: 15 U/L (ref 15–37)
BACTERIA URNS QL MICRO: ABNORMAL /HPF
BASOPHILS # BLD: 0.1 K/UL (ref 0–0.2)
BASOPHILS NFR BLD: 0.8 %
BILIRUB SERPL-MCNC: 0.3 MG/DL (ref 0–1)
BILIRUB UR QL STRIP.AUTO: NEGATIVE
BUN SERPL-MCNC: 22 MG/DL (ref 7–20)
CALCIUM SERPL-MCNC: 9.2 MG/DL (ref 8.3–10.6)
CHLORIDE SERPL-SCNC: 105 MMOL/L (ref 99–110)
CLARITY UR: CLEAR
CO2 SERPL-SCNC: 29 MMOL/L (ref 21–32)
COARSE GRAN CASTS #/AREA URNS LPF: ABNORMAL /LPF (ref 0–2)
COLOR UR: ABNORMAL
CREAT SERPL-MCNC: 1.3 MG/DL (ref 0.8–1.3)
DEPRECATED RDW RBC AUTO: 14.4 % (ref 12.4–15.4)
EKG ATRIAL RATE: 81 BPM
EKG DIAGNOSIS: NORMAL
EKG P AXIS: 54 DEGREES
EKG P-R INTERVAL: 182 MS
EKG Q-T INTERVAL: 426 MS
EKG QRS DURATION: 128 MS
EKG QTC CALCULATION (BAZETT): 494 MS
EKG R AXIS: 22 DEGREES
EKG T AXIS: 52 DEGREES
EKG VENTRICULAR RATE: 81 BPM
EOSINOPHIL # BLD: 0.5 K/UL (ref 0–0.6)
EOSINOPHIL NFR BLD: 4.5 %
EPI CELLS #/AREA URNS HPF: ABNORMAL /HPF (ref 0–5)
GFR SERPLBLD CREATININE-BSD FMLA CKD-EPI: 52 ML/MIN/{1.73_M2}
GLUCOSE BLD-MCNC: 80 MG/DL (ref 70–99)
GLUCOSE BLD-MCNC: 87 MG/DL (ref 70–99)
GLUCOSE SERPL-MCNC: 95 MG/DL (ref 70–99)
GLUCOSE UR STRIP.AUTO-MCNC: NEGATIVE MG/DL
HCT VFR BLD AUTO: 36.9 % (ref 40.5–52.5)
HGB BLD-MCNC: 12.4 G/DL (ref 13.5–17.5)
HGB UR QL STRIP.AUTO: ABNORMAL
INR PPP: 1.15 (ref 0.84–1.16)
KETONES UR STRIP.AUTO-MCNC: NEGATIVE MG/DL
LEUKOCYTE ESTERASE UR QL STRIP.AUTO: NEGATIVE
LYMPHOCYTES # BLD: 2 K/UL (ref 1–5.1)
LYMPHOCYTES NFR BLD: 17 %
MAGNESIUM SERPL-MCNC: 1.8 MG/DL (ref 1.8–2.4)
MCH RBC QN AUTO: 30.3 PG (ref 26–34)
MCHC RBC AUTO-ENTMCNC: 33.5 G/DL (ref 31–36)
MCV RBC AUTO: 90.6 FL (ref 80–100)
MONOCYTES # BLD: 1.3 K/UL (ref 0–1.3)
MONOCYTES NFR BLD: 11 %
NEUTROPHILS # BLD: 7.7 K/UL (ref 1.7–7.7)
NEUTROPHILS NFR BLD: 66.7 %
NITRITE UR QL STRIP.AUTO: NEGATIVE
PERFORMED ON: NORMAL
PERFORMED ON: NORMAL
PH UR STRIP.AUTO: 7 [PH] (ref 5–8)
PHOSPHATE SERPL-MCNC: 3.5 MG/DL (ref 2.5–4.9)
PLATELET # BLD AUTO: 193 K/UL (ref 135–450)
PMV BLD AUTO: 7.7 FL (ref 5–10.5)
POTASSIUM SERPL-SCNC: 3.8 MMOL/L (ref 3.5–5.1)
PROCALCITONIN SERPL IA-MCNC: 0.16 NG/ML (ref 0–0.15)
PROT SERPL-MCNC: 6.5 G/DL (ref 6.4–8.2)
PROT UR STRIP.AUTO-MCNC: 30 MG/DL
PROTHROMBIN TIME: 14.7 SEC (ref 11.5–14.8)
RBC # BLD AUTO: 4.08 M/UL (ref 4.2–5.9)
RBC #/AREA URNS HPF: ABNORMAL /HPF (ref 0–4)
SODIUM SERPL-SCNC: 142 MMOL/L (ref 136–145)
SP GR UR STRIP.AUTO: 1.02 (ref 1–1.03)
TROPONIN, HIGH SENSITIVITY: 21 NG/L (ref 0–22)
UA COMPLETE W REFLEX CULTURE PNL UR: YES
UA DIPSTICK W REFLEX MICRO PNL UR: YES
URN SPEC COLLECT METH UR: ABNORMAL
UROBILINOGEN UR STRIP-ACNC: 0.2 E.U./DL
WBC # BLD AUTO: 11.5 K/UL (ref 4–11)
WBC #/AREA URNS HPF: ABNORMAL /HPF (ref 0–5)

## 2023-10-04 PROCEDURE — 85610 PROTHROMBIN TIME: CPT

## 2023-10-04 PROCEDURE — 6360000002 HC RX W HCPCS: Performed by: INTERNAL MEDICINE

## 2023-10-04 PROCEDURE — 6360000002 HC RX W HCPCS: Performed by: EMERGENCY MEDICINE

## 2023-10-04 PROCEDURE — 87086 URINE CULTURE/COLONY COUNT: CPT

## 2023-10-04 PROCEDURE — 36415 COLL VENOUS BLD VENIPUNCTURE: CPT

## 2023-10-04 PROCEDURE — 6370000000 HC RX 637 (ALT 250 FOR IP): Performed by: INTERNAL MEDICINE

## 2023-10-04 PROCEDURE — 2580000003 HC RX 258: Performed by: EMERGENCY MEDICINE

## 2023-10-04 PROCEDURE — 94761 N-INVAS EAR/PLS OXIMETRY MLT: CPT

## 2023-10-04 PROCEDURE — 96374 THER/PROPH/DIAG INJ IV PUSH: CPT

## 2023-10-04 PROCEDURE — 93005 ELECTROCARDIOGRAM TRACING: CPT | Performed by: EMERGENCY MEDICINE

## 2023-10-04 PROCEDURE — 1200000000 HC SEMI PRIVATE

## 2023-10-04 PROCEDURE — 84145 PROCALCITONIN (PCT): CPT

## 2023-10-04 PROCEDURE — 83735 ASSAY OF MAGNESIUM: CPT

## 2023-10-04 PROCEDURE — 71046 X-RAY EXAM CHEST 2 VIEWS: CPT

## 2023-10-04 PROCEDURE — 2700000000 HC OXYGEN THERAPY PER DAY

## 2023-10-04 PROCEDURE — 99285 EMERGENCY DEPT VISIT HI MDM: CPT

## 2023-10-04 PROCEDURE — 81001 URINALYSIS AUTO W/SCOPE: CPT

## 2023-10-04 PROCEDURE — 2580000003 HC RX 258: Performed by: INTERNAL MEDICINE

## 2023-10-04 PROCEDURE — 80053 COMPREHEN METABOLIC PANEL: CPT

## 2023-10-04 PROCEDURE — 93010 ELECTROCARDIOGRAM REPORT: CPT | Performed by: INTERNAL MEDICINE

## 2023-10-04 PROCEDURE — 84100 ASSAY OF PHOSPHORUS: CPT

## 2023-10-04 PROCEDURE — 70450 CT HEAD/BRAIN W/O DYE: CPT

## 2023-10-04 PROCEDURE — 84484 ASSAY OF TROPONIN QUANT: CPT

## 2023-10-04 PROCEDURE — 85025 COMPLETE CBC W/AUTO DIFF WBC: CPT

## 2023-10-04 RX ORDER — HYDROCODONE BITARTRATE AND ACETAMINOPHEN 5; 325 MG/1; MG/1
1 TABLET ORAL EVERY 8 HOURS PRN
Status: ON HOLD | COMMUNITY
End: 2023-10-07 | Stop reason: SDUPTHER

## 2023-10-04 RX ORDER — SODIUM CHLORIDE 9 MG/ML
INJECTION, SOLUTION INTRAVENOUS PRN
Status: DISCONTINUED | OUTPATIENT
Start: 2023-10-04 | End: 2023-10-07 | Stop reason: HOSPADM

## 2023-10-04 RX ORDER — ONDANSETRON 4 MG/1
4 TABLET, ORALLY DISINTEGRATING ORAL EVERY 8 HOURS PRN
Status: DISCONTINUED | OUTPATIENT
Start: 2023-10-04 | End: 2023-10-07 | Stop reason: HOSPADM

## 2023-10-04 RX ORDER — ONDANSETRON 4 MG/1
4 TABLET, FILM COATED ORAL EVERY 6 HOURS PRN
COMMUNITY

## 2023-10-04 RX ORDER — SODIUM CHLORIDE 0.9 % (FLUSH) 0.9 %
5-40 SYRINGE (ML) INJECTION EVERY 12 HOURS SCHEDULED
Status: DISCONTINUED | OUTPATIENT
Start: 2023-10-04 | End: 2023-10-07 | Stop reason: HOSPADM

## 2023-10-04 RX ORDER — IPRATROPIUM BROMIDE AND ALBUTEROL SULFATE 2.5; .5 MG/3ML; MG/3ML
1 SOLUTION RESPIRATORY (INHALATION) EVERY 4 HOURS PRN
Status: DISCONTINUED | OUTPATIENT
Start: 2023-10-04 | End: 2023-10-07 | Stop reason: HOSPADM

## 2023-10-04 RX ORDER — DIPHENHYDRAMINE HYDROCHLORIDE 50 MG/ML
25 INJECTION INTRAMUSCULAR; INTRAVENOUS ONCE
Status: COMPLETED | OUTPATIENT
Start: 2023-10-04 | End: 2023-10-04

## 2023-10-04 RX ORDER — ONDANSETRON 2 MG/ML
4 INJECTION INTRAMUSCULAR; INTRAVENOUS EVERY 6 HOURS PRN
Status: DISCONTINUED | OUTPATIENT
Start: 2023-10-04 | End: 2023-10-07 | Stop reason: HOSPADM

## 2023-10-04 RX ORDER — GLIMEPIRIDE 1 MG/1
3 TABLET ORAL
COMMUNITY

## 2023-10-04 RX ORDER — ACETAMINOPHEN 325 MG/1
650 TABLET ORAL EVERY 4 HOURS PRN
COMMUNITY

## 2023-10-04 RX ORDER — AMLODIPINE BESYLATE 5 MG/1
5 TABLET ORAL DAILY
COMMUNITY

## 2023-10-04 RX ORDER — POLYETHYLENE GLYCOL 3350 17 G/17G
17 POWDER, FOR SOLUTION ORAL 2 TIMES DAILY
COMMUNITY

## 2023-10-04 RX ORDER — ACETAMINOPHEN 325 MG/1
650 TABLET ORAL EVERY 6 HOURS PRN
Status: DISCONTINUED | OUTPATIENT
Start: 2023-10-04 | End: 2023-10-07 | Stop reason: HOSPADM

## 2023-10-04 RX ORDER — LEVOTHYROXINE SODIUM 0.12 MG/1
125 TABLET ORAL NIGHTLY
COMMUNITY

## 2023-10-04 RX ORDER — ASPIRIN 81 MG/1
81 TABLET ORAL DAILY
COMMUNITY

## 2023-10-04 RX ORDER — SENNOSIDES A AND B 8.6 MG/1
2 TABLET, FILM COATED ORAL NIGHTLY
COMMUNITY

## 2023-10-04 RX ORDER — QUETIAPINE FUMARATE 25 MG/1
25 TABLET, FILM COATED ORAL ONCE
Status: COMPLETED | OUTPATIENT
Start: 2023-10-04 | End: 2023-10-04

## 2023-10-04 RX ORDER — M-VIT,TX,IRON,MINS/CALC/FOLIC 27MG-0.4MG
1 TABLET ORAL DAILY
COMMUNITY

## 2023-10-04 RX ORDER — MEMANTINE HYDROCHLORIDE 5 MG/1
5 TABLET ORAL 2 TIMES DAILY
COMMUNITY

## 2023-10-04 RX ORDER — SODIUM CHLORIDE 0.9 % (FLUSH) 0.9 %
5-40 SYRINGE (ML) INJECTION PRN
Status: DISCONTINUED | OUTPATIENT
Start: 2023-10-04 | End: 2023-10-07 | Stop reason: HOSPADM

## 2023-10-04 RX ORDER — LEVOTHYROXINE SODIUM 0.05 MG/1
125 TABLET ORAL DAILY
Status: DISCONTINUED | OUTPATIENT
Start: 2023-10-05 | End: 2023-10-07 | Stop reason: HOSPADM

## 2023-10-04 RX ORDER — OLOPATADINE HYDROCHLORIDE 2 MG/ML
1 SOLUTION/ DROPS OPHTHALMIC DAILY
COMMUNITY

## 2023-10-04 RX ORDER — RISPERIDONE 0.5 MG/1
0.5 TABLET ORAL
COMMUNITY

## 2023-10-04 RX ORDER — POLYETHYLENE GLYCOL 3350 17 G/17G
17 POWDER, FOR SOLUTION ORAL DAILY PRN
Status: DISCONTINUED | OUTPATIENT
Start: 2023-10-04 | End: 2023-10-07 | Stop reason: HOSPADM

## 2023-10-04 RX ORDER — LANOLIN ALCOHOL/MO/W.PET/CERES
3 CREAM (GRAM) TOPICAL NIGHTLY
COMMUNITY

## 2023-10-04 RX ORDER — ACETAMINOPHEN 650 MG/1
650 SUPPOSITORY RECTAL EVERY 6 HOURS PRN
Status: DISCONTINUED | OUTPATIENT
Start: 2023-10-04 | End: 2023-10-07 | Stop reason: HOSPADM

## 2023-10-04 RX ORDER — AZITHROMYCIN 250 MG/1
500 TABLET, FILM COATED ORAL EVERY 24 HOURS
Status: CANCELLED | OUTPATIENT
Start: 2023-10-04 | End: 2023-10-07

## 2023-10-04 RX ORDER — DONEPEZIL HYDROCHLORIDE 10 MG/1
10 TABLET, FILM COATED ORAL NIGHTLY
COMMUNITY

## 2023-10-04 RX ORDER — IPRATROPIUM BROMIDE AND ALBUTEROL SULFATE 2.5; .5 MG/3ML; MG/3ML
1 SOLUTION RESPIRATORY (INHALATION)
Status: DISCONTINUED | OUTPATIENT
Start: 2023-10-05 | End: 2023-10-04

## 2023-10-04 RX ORDER — ENOXAPARIN SODIUM 100 MG/ML
40 INJECTION SUBCUTANEOUS DAILY
Status: DISCONTINUED | OUTPATIENT
Start: 2023-10-04 | End: 2023-10-07 | Stop reason: HOSPADM

## 2023-10-04 RX ORDER — 0.9 % SODIUM CHLORIDE 0.9 %
500 INTRAVENOUS SOLUTION INTRAVENOUS ONCE
Status: COMPLETED | OUTPATIENT
Start: 2023-10-04 | End: 2023-10-04

## 2023-10-04 RX ORDER — FOLIC ACID 1 MG/1
1 TABLET ORAL DAILY
COMMUNITY

## 2023-10-04 RX ADMIN — CEFTRIAXONE SODIUM 1000 MG: 1 INJECTION, POWDER, FOR SOLUTION INTRAMUSCULAR; INTRAVENOUS at 20:55

## 2023-10-04 RX ADMIN — QUETIAPINE FUMARATE 25 MG: 25 TABLET ORAL at 21:45

## 2023-10-04 RX ADMIN — ENOXAPARIN SODIUM 40 MG: 100 INJECTION SUBCUTANEOUS at 21:47

## 2023-10-04 RX ADMIN — SODIUM CHLORIDE 500 ML: 9 INJECTION, SOLUTION INTRAVENOUS at 18:17

## 2023-10-04 RX ADMIN — CEFEPIME 2000 MG: 2 INJECTION, POWDER, FOR SOLUTION INTRAVENOUS at 16:48

## 2023-10-04 RX ADMIN — DIPHENHYDRAMINE HYDROCHLORIDE 25 MG: 50 INJECTION INTRAMUSCULAR; INTRAVENOUS at 18:17

## 2023-10-04 RX ADMIN — ACETAMINOPHEN 650 MG: 325 TABLET ORAL at 21:50

## 2023-10-04 ASSESSMENT — PAIN DESCRIPTION - DESCRIPTORS: DESCRIPTORS: ACHING

## 2023-10-04 ASSESSMENT — LIFESTYLE VARIABLES
HOW OFTEN DO YOU HAVE A DRINK CONTAINING ALCOHOL: NEVER
HOW MANY STANDARD DRINKS CONTAINING ALCOHOL DO YOU HAVE ON A TYPICAL DAY: PATIENT DOES NOT DRINK
HOW MANY STANDARD DRINKS CONTAINING ALCOHOL DO YOU HAVE ON A TYPICAL DAY: PATIENT DOES NOT DRINK
HOW OFTEN DO YOU HAVE A DRINK CONTAINING ALCOHOL: NEVER

## 2023-10-04 ASSESSMENT — PAIN DESCRIPTION - LOCATION: LOCATION: HEAD

## 2023-10-04 ASSESSMENT — PAIN SCALES - GENERAL: PAINLEVEL_OUTOF10: 3

## 2023-10-04 ASSESSMENT — PAIN - FUNCTIONAL ASSESSMENT: PAIN_FUNCTIONAL_ASSESSMENT: ACTIVITIES ARE NOT PREVENTED

## 2023-10-04 ASSESSMENT — ENCOUNTER SYMPTOMS: COUGH: 1

## 2023-10-04 NOTE — ED NOTES
Pt pulled out IV, Antibiotics were given, fluids were not given at this time, pt refused the placement of another IV  MD aroldo Lomax RN  10/04/23 [id number]

## 2023-10-04 NOTE — ED NOTES
Report given to NILTON Limon, pt has ripped out IV ad B/P cuff, pt was given benadryl to help with anxiety. Pt has calmed down but NILTON Lozada recommended NILTON Limon and charge nurse to place a sitter with pt. Pt currently sleeping and calm. 2 IV's in place.       Sophia Simon RN  10/04/23 [id number]

## 2023-10-04 NOTE — ED PROVIDER NOTES
here in the department shows sinus rhythm with PVC. No ST segment ovation, similar pression, hyperacute T waves. Right bundle branch block, which is new from October 2020. No STEMI criteria. Ventricular 81. QTc 494 the setting of RBBB. RADIOLOGY:    Non-plain film images such as CT, Ultrasound and MRI are read by the radiologist. Interpretation per the Radiologist below, if available at the time of this note:  CT HEAD WO CONTRAST   Preliminary Result   No acute intracranial abnormality. Age-appropriate atrophy and small-vessel   disease ischemic changes redemonstrated. XR CHEST (2 VW)   Final Result   Some hypoventilatory changes in the left lung base. A pneumonia cannot be   excluded. PROCEDURES:  Unless otherwise noted below, none. Procedures      MEDICATIONS:   Medications   sodium chloride 0.9 % bolus 500 mL (has no administration in time range)   ceFEPIme (MAXIPIME) 2,000 mg in sodium chloride 0.9 % 100 mL IVPB (mini-bag) (2,000 mg IntraVENous New Bag 10/4/23 5335)       _____________________________________    MEDICAL DECISION MAKING:     Patient is a 80 y.o. male with a significant PMHx of diabetes, dementia, lives in a nursing home, presenting emerged department today, comfort care CODE STATUS, after a fall yesterday, altered mental status, generalized weakness, and what the nursing home had reported was a facial droop, but not appreciated here on physical exam.  GCS is 8, again, comfort care. No obvious focal nausea deficits, but patient does not really follow commands today. Vital signs completely stable. In the emergency department, patient is in the low 90s, sometimes high 80s, placed on 2 L nasal cannula, does not always wear oxygen. Patient has a leukocytosis of 11.5, but no to SIRS criteria, although he does have pneumonia on chest x-ray.   Given his rhonchi, fatigue, cough, and overall ill-appearing to his daughter, will treat with IV treatment of emergent care for this encounter. This chart was generated in part by using Dragon Dictation system and may contain errors related to that system including errors in grammar, punctuation, and spelling, as well as words and phrases that may be inappropriate. If there are any questions or concerns please feel free to contact the dictating provider for clarification.      NILA OBANDO DO (electronically signed)   [address] [place] [address],[address], Dariana Vyas DO  10/04/23 1724       Nila Obando DO  10/04/23 1725

## 2023-10-05 ENCOUNTER — APPOINTMENT (OUTPATIENT)
Dept: GENERAL RADIOLOGY | Age: 88
DRG: 177 | End: 2023-10-05
Payer: MEDICARE

## 2023-10-05 PROBLEM — J18.9 PNEUMONIA OF LEFT LOWER LOBE DUE TO INFECTIOUS ORGANISM: Status: ACTIVE | Noted: 2023-10-04

## 2023-10-05 PROBLEM — G93.41 ACUTE METABOLIC ENCEPHALOPATHY: Status: ACTIVE | Noted: 2023-10-05

## 2023-10-05 PROBLEM — R53.83 OTHER FATIGUE: Status: ACTIVE | Noted: 2023-10-05

## 2023-10-05 PROBLEM — D72.823 LEUKEMOID REACTION: Status: ACTIVE | Noted: 2023-10-05

## 2023-10-05 LAB
ALBUMIN SERPL-MCNC: 3.5 G/DL (ref 3.4–5)
ALBUMIN/GLOB SERPL: 1.3 {RATIO} (ref 1.1–2.2)
ALP SERPL-CCNC: 86 U/L (ref 40–129)
ALT SERPL-CCNC: 8 U/L (ref 10–40)
AMMONIA PLAS-SCNC: 30 UMOL/L (ref 16–60)
ANION GAP SERPL CALCULATED.3IONS-SCNC: 11 MMOL/L (ref 3–16)
AST SERPL-CCNC: 14 U/L (ref 15–37)
BACTERIA UR CULT: NORMAL
BASOPHILS # BLD: 0 K/UL (ref 0–0.2)
BASOPHILS NFR BLD: 0.2 %
BILIRUB SERPL-MCNC: 0.4 MG/DL (ref 0–1)
BUN SERPL-MCNC: 17 MG/DL (ref 7–20)
CALCIUM SERPL-MCNC: 9.1 MG/DL (ref 8.3–10.6)
CHLORIDE SERPL-SCNC: 104 MMOL/L (ref 99–110)
CO2 SERPL-SCNC: 24 MMOL/L (ref 21–32)
CREAT SERPL-MCNC: 1.1 MG/DL (ref 0.8–1.3)
DEPRECATED RDW RBC AUTO: 14.5 % (ref 12.4–15.4)
EOSINOPHIL # BLD: 0.6 K/UL (ref 0–0.6)
EOSINOPHIL NFR BLD: 4.4 %
GFR SERPLBLD CREATININE-BSD FMLA CKD-EPI: >60 ML/MIN/{1.73_M2}
GLUCOSE BLD-MCNC: 161 MG/DL (ref 70–99)
GLUCOSE BLD-MCNC: 94 MG/DL (ref 70–99)
GLUCOSE BLD-MCNC: 94 MG/DL (ref 70–99)
GLUCOSE BLD-MCNC: 99 MG/DL (ref 70–99)
GLUCOSE SERPL-MCNC: 90 MG/DL (ref 70–99)
HCT VFR BLD AUTO: 38.3 % (ref 40.5–52.5)
HGB BLD-MCNC: 12.9 G/DL (ref 13.5–17.5)
LYMPHOCYTES # BLD: 1.3 K/UL (ref 1–5.1)
LYMPHOCYTES NFR BLD: 9.7 %
MCH RBC QN AUTO: 30.2 PG (ref 26–34)
MCHC RBC AUTO-ENTMCNC: 33.6 G/DL (ref 31–36)
MCV RBC AUTO: 89.9 FL (ref 80–100)
MONOCYTES # BLD: 1.2 K/UL (ref 0–1.3)
MONOCYTES NFR BLD: 9 %
NEUTROPHILS # BLD: 10.5 K/UL (ref 1.7–7.7)
NEUTROPHILS NFR BLD: 76.7 %
PERFORMED ON: ABNORMAL
PERFORMED ON: NORMAL
PLATELET # BLD AUTO: 182 K/UL (ref 135–450)
PMV BLD AUTO: 8.4 FL (ref 5–10.5)
POTASSIUM SERPL-SCNC: 4 MMOL/L (ref 3.5–5.1)
PROT SERPL-MCNC: 6.3 G/DL (ref 6.4–8.2)
RBC # BLD AUTO: 4.26 M/UL (ref 4.2–5.9)
SODIUM SERPL-SCNC: 139 MMOL/L (ref 136–145)
TSH SERPL DL<=0.005 MIU/L-ACNC: 0.31 UIU/ML (ref 0.27–4.2)
WBC # BLD AUTO: 13.8 K/UL (ref 4–11)

## 2023-10-05 PROCEDURE — 2580000003 HC RX 258: Performed by: INTERNAL MEDICINE

## 2023-10-05 PROCEDURE — 6360000002 HC RX W HCPCS: Performed by: INTERNAL MEDICINE

## 2023-10-05 PROCEDURE — 82140 ASSAY OF AMMONIA: CPT

## 2023-10-05 PROCEDURE — 92610 EVALUATE SWALLOWING FUNCTION: CPT

## 2023-10-05 PROCEDURE — 94761 N-INVAS EAR/PLS OXIMETRY MLT: CPT

## 2023-10-05 PROCEDURE — 84443 ASSAY THYROID STIM HORMONE: CPT

## 2023-10-05 PROCEDURE — 97530 THERAPEUTIC ACTIVITIES: CPT

## 2023-10-05 PROCEDURE — 97535 SELF CARE MNGMENT TRAINING: CPT

## 2023-10-05 PROCEDURE — 80053 COMPREHEN METABOLIC PANEL: CPT

## 2023-10-05 PROCEDURE — 2700000000 HC OXYGEN THERAPY PER DAY

## 2023-10-05 PROCEDURE — 85025 COMPLETE CBC W/AUTO DIFF WBC: CPT

## 2023-10-05 PROCEDURE — 92526 ORAL FUNCTION THERAPY: CPT

## 2023-10-05 PROCEDURE — 74230 X-RAY XM SWLNG FUNCJ C+: CPT

## 2023-10-05 PROCEDURE — 97162 PT EVAL MOD COMPLEX 30 MIN: CPT

## 2023-10-05 PROCEDURE — 92611 MOTION FLUOROSCOPY/SWALLOW: CPT

## 2023-10-05 PROCEDURE — 6370000000 HC RX 637 (ALT 250 FOR IP): Performed by: INTERNAL MEDICINE

## 2023-10-05 PROCEDURE — 1200000000 HC SEMI PRIVATE

## 2023-10-05 PROCEDURE — 99233 SBSQ HOSP IP/OBS HIGH 50: CPT | Performed by: INTERNAL MEDICINE

## 2023-10-05 PROCEDURE — 36415 COLL VENOUS BLD VENIPUNCTURE: CPT

## 2023-10-05 PROCEDURE — 97166 OT EVAL MOD COMPLEX 45 MIN: CPT

## 2023-10-05 RX ADMIN — VANCOMYCIN HYDROCHLORIDE 1500 MG: 10 INJECTION, POWDER, LYOPHILIZED, FOR SOLUTION INTRAVENOUS at 10:48

## 2023-10-05 RX ADMIN — ENOXAPARIN SODIUM 40 MG: 100 INJECTION SUBCUTANEOUS at 09:00

## 2023-10-05 RX ADMIN — LEVOTHYROXINE SODIUM 125 MCG: 50 TABLET ORAL at 07:08

## 2023-10-05 RX ADMIN — Medication 10 ML: at 09:00

## 2023-10-05 RX ADMIN — CEFEPIME 2000 MG: 2 INJECTION, POWDER, FOR SOLUTION INTRAVENOUS at 14:21

## 2023-10-05 RX ADMIN — Medication 10 ML: at 22:18

## 2023-10-05 ASSESSMENT — PAIN SCALES - GENERAL: PAINLEVEL_OUTOF10: 0

## 2023-10-05 NOTE — PROGRESS NOTES
Spoke with nurse at [address] [place] [address]. and he states pt code status is DNR-CC.  I have placed a perfect serve to the nocturnist.

## 2023-10-05 NOTE — PLAN OF CARE
Problem: Discharge Planning  Goal: Discharge to home or other facility with appropriate resources  Outcome: Progressing  Flowsheets (Taken 10/4/2023 2242 by Inna Soriano RN)  Discharge to home or other facility with appropriate resources: Identify barriers to discharge with patient and caregiver     Problem: Safety - Medical Restraint  Goal: Remains free of injury from restraints (Restraint for Interference with Medical Device)  Description: INTERVENTIONS:  1. Determine that other, less restrictive measures have been tried or would not be effective before applying the restraint  2. Evaluate the patient's condition at the time of restraint application  3. Inform patient/family regarding the reason for restraint  4. Q2H: Monitor safety, psychosocial status, comfort, nutrition and hydration  Outcome: Progressing     Problem: Safety - Adult  Goal: Free from fall injury  Outcome: Progressing     Problem: ABCDS Injury Assessment  Goal: Absence of physical injury  Outcome: Progressing     Problem: Skin/Tissue Integrity  Goal: Absence of new skin breakdown  Description: 1. Monitor for areas of redness and/or skin breakdown  2. Assess vascular access sites hourly  3. Every 4-6 hours minimum:  Change oxygen saturation probe site  4. Every 4-6 hours:  If on nasal continuous positive airway pressure, respiratory therapy assess nares and determine need for appliance change or resting period. Outcome: Progressing     Problem: Confusion  Goal: Confusion, delirium, dementia, or psychosis is improved or at baseline  Description: INTERVENTIONS:  1. Assess for possible contributors to thought disturbance, including medications, impaired vision or hearing, underlying metabolic abnormalities, dehydration, psychiatric diagnoses, and notify attending LIP  2. [place] high risk fall precautions, as indicated  3. Provide frequent short contacts to provide reality reorientation, refocusing and direction  4.  Decrease

## 2023-10-05 NOTE — PROGRESS NOTES
Inpatient Occupational Therapy Evaluation and Treatment    Unit: [facility]  Date:  10/5/2023  Patient Name:    Janina Lam  Admitting diagnosis:  Pneumonia, bacterial [J15.9]  Hypoxia [R09.02]  RBBB [I45.10]  Altered mental status, unspecified altered mental status type [R41.82]  Pneumonia of left lower lobe due to infectious organism [J18.9]  Other fatigue [R53.83]  Admit Date:  10/4/2023  Precautions/Restrictions/WB Status/ Lines/ Wounds/ Oxygen: Fall risk, Bed/chair alarm, Lines (IV, Supplemental O2 (2L), and external catheter), Confusion, [place] (hard of hearing), and Telesitter    Pt seen for cotreatment this date due to patient safety and limited functional status information    Treatment Time:  11:39 - 12:15  Treatment Number:  1  Timed Code Treatment Minutes: 26 minutes  Total Treatment Minutes:  36  minutes    Patient Goals for Therapy: none stated        Discharge Recommendations: SNF  DME needs for discharge: Defer to facility       Therapy recommendations for staff:   Assist of 2 for ambulation with use of rolling walker (RW) and gait belt to/from [facility]  to/from chair    History of Present Illness:  per Dr German Milligan H&P 10/4/23:  \"80 y.o. male who presented to [facility] with past medical history of type 2 diabetes, hypertension, hypothyroidism presented to ED due to x-ray acute encephalopathy     Patient came from a nursing home due to worsening acute encephalopathy was noted to have left facial droop yesterday and this was a new finding. Patient is on comfort care and is DNR CC at the facility and was having very difficulty with eating food and drinking better. Patient also had a fall yesterday with unclear etiology as it was unwitnessed. In the ED patient was noted to have hypoglycemia and thus will start on D5 with improvement. Patient is admitted for acute encephalopathy. \"    Home Health S4 Level Recommendation:  NA    AM-PAC Score: AM-PAC Inpatient Daily Activity Raw Score: 14 training. Patient seen by Occupational Therapy Student under direct supervision of Occupational Therapist cosigning this note.     Electronically signed by Sukumar Arzola OT Student on 10/5/2023 at 12:44 PM    Cosign: ADRIANA Kaufman/CORWIN [id number]        If patient discharges from this facility prior to next visit, this note will serve as the Discharge Summary

## 2023-10-05 NOTE — PROGRESS NOTES
Patient admitted to room 325 from ED. Patient oriented to room, call light, bed rails, phone, lights and bathroom. Patient instructed about the schedule of the day including: vital sign frequency, lab draws, possible tests, frequency of MD and staff rounds, daily weights, I &O's and prescribed diet. Recliner Assessment  Patient is not able to demonstrated the ability to move from a reclining position to an upright position within the recliner. Patient is confused, demented and /or unable to follow instruction. 4 Eyes Skin Assessment     NAME:  Dave Webster  YOB: 1933  MEDICAL RECORD NUMBER:  [id number]    The patient is being assessed for  Admission    I agree that at least one RN has performed a thorough Head to Toe Skin Assessment on the patient. ALL assessment sites listed below have been assessed. Areas assessed by both nurses:    Head, Face, Ears, Shoulders, Back, Chest, Arms, Elbows, Hands, Sacrum. Buttock, Coccyx, Ischium, Legs. Feet and Heels, and Under Medical Devices         - one spot on L buttocks  - excoriation in L brad area  Does the Patient have a Wound?  No noted wound(s)       Gavin Prevention initiated by RN: Yes  Wound Care Orders initiated by RN: No    Pressure Injury (Stage 3,4, Unstageable, DTI, NWPT, and Complex wounds) if present, place Wound referral order by RN under : No    New Ostomies, if present place, Ostomy referral order under : No     Nurse 1 eSignature: Electronically signed by Gino Rosa RN on 10/4/23 at 10:19 PM EDT    **SHARE this note so that the co-signing nurse can place an eSignature**    Nurse 2 eSignature: Electronically signed by Elijio Aase, RN on 10/4/23 at 11:28 PM EDT

## 2023-10-05 NOTE — PROGRESS NOTES
Trials     [] Ice Chips   [] Other:  [] FEES                                                 Dysphagia Therapeutic Intervention:   []  Bolus control Exercises  []  Oral Motor Exercises  []  Antonio Water Protocol  []  Thermal Stimulation  [x]  Oral Care    []  Vital Stim/NMES  []  Laryngeal Exercises  [x]  Patient/Family Education  []  Pharyngeal Exercises  []  Therapeutic PO trials with SLP  []  Diet tolerance monitoring  []  Other:     Referrals:   [] ENT    [] PT  [] Pulmonology [x] GI  [] Neurology  [] RD  [] OT   []     Goals:  Short Term Goals:  Timeframe for Short Term Goals: (5 days on 10/10/23)  Goal 1: The patient will tolerate recommended diet with no clinical s/s of aspiration 5/5  Goal 2: The patient/caregiver will demonstrate understanding of compensatory swallow strategies, for improved swallow safety  Goal 3: The patient will tolerate instrumental assessment when able     Long Term Goals:   Timeframe for Long Term Goals: (7 days 10/12/23 )  Goal 1: The patient will tolerate least restrictive diet with no clinical s/s of aspiration or worsening respiratory/pulmonary status    Treatment:  Skilled instruction completed with patient re: evidenced based practice regarding recommendations and POC, importance of oral care to reduce adverse affects in the event of aspiration, and instruction of recommended compensatory strategies developed based upon clinical exam. Pt able to recall/demonstrate compensatory strategies with mod cues.       Pt Education: SLP educated the patient re: Role of SLP, rationale for completion of assessment, anatomical components of swallow structures as they pertain to airway protection, results of assessment, recommendations, POC, and rationale for MBS  Pt Education Response: no evidence of learning, RN aware, and caregiver aware    Duration/Frequency of Tx: 2-4x/wk    Individuals Consulted:   [x]  Patient     []  NP         [x]  RN   []  RD                   []  MD

## 2023-10-05 NOTE — ACP (ADVANCE CARE PLANNING)
Advance Care Planning     General Advance Care Planning (ACP) Conversation    Date of Conversation: 10/4/2023  Conducted with:  Healthcare Decision Maker: Named in Advance Directive or Healthcare Power of  (name) Bradley Mascorro Decision Maker:    Primary Decision Maker: Slade Small - [phone]    Secondary Decision Maker: Mariely Berrios - Other - [phone]    Supplemental (Other) Decision Maker: Sola Regalado - [phone]  Click here to complete Healthcare Decision Makers including selection of the Healthcare Decision Maker Relationship (ie \"Primary\"). Today we documented Decision Maker(s) consistent with ACP documents on file.     Content/Action Overview:    Reviewed DNR/DNI and patient confirms current DNR status - completed forms on file (place new order if needed)    [facility]    Length of Voluntary ACP Conversation in minutes:  <16 minutes (Non-Billable)    Christal Manley RN

## 2023-10-05 NOTE — PROCEDURES
SPEECH/LANGUAGE PATHOLOGY  Swallowing Disorders and Dysphagia  VIDEOFLUOROSCOPIC STUDY OF SWALLOWING (MBS)  Facility/Department: [address] MEDICAL-SURGICAL    Diet Recommendation: IDDSI 7 Regular- Easy To Chew ; IDDSI 0 Thin Liquids; Meds whole with thin liquids  Risk Management: upright for all intake, stay upright for at least 30 mins after intake, small bites/sips, oral care 2-3x/day to reduce adverse affects in the event of aspiration, and hold PO and contact SLP if s/s of aspiration or worsening respiratory status develop. PATIENT NAME:  Grover Webster      :  1/10/1933    Room: 0225/0225-02   TODAY'S DATE:  10/5/2023    [x]The admitting diagnosis and active problem list, as listed below have been reviewed prior to initiation of this evaluation.      ADMITTING DIAGNOSIS: Pneumonia, bacterial [J15.9]  Hypoxia [R09.02]  RBBB [I45.10]  Altered mental status, unspecified altered mental status type [R41.82]  Pneumonia of left lower lobe due to infectious organism [J18.9]  Other fatigue [R53.83]     ACTIVE PROBLEM LIST:   Patient Active Problem List   Diagnosis    CHANELL (acute kidney injury) ([address])    Severe protein-calorie malnutrition ([address])    Hypokalemia    Hypomagnesemia    Hypothyroidism    Pneumonia of left lower lobe due to infectious organism    Other fatigue    Leukemoid reaction    Acute metabolic encephalopathy           PAST MEDICAL HISTORY        Diagnosis Date    Actinic keratosis     Anemia     Basal cell carcinoma of skin     Chronic kidney disease (CKD), stage III (moderate) (HCC)     Dementia (HCC)     Diabetes mellitus ([address])     Hypertension     Hypothyroidism     Hypothyroidism     Peripheral vascular disease ([address])     Sensorineural hearing loss, bilateral     Thyroid disease     Type 2 diabetes mellitus ([address])        PAST SURGICAL HISTORY    Past Surgical History:   Procedure Laterality Date    EYE SURGERY      cataract    HERNIA REPAIR         ALLERGIES    Allergies   Allergen Reactions

## 2023-10-05 NOTE — PLAN OF CARE
Problem: SLP Adult - Impaired Swallowing  Goal: By Discharge: Advance to least restrictive diet without signs or symptoms of aspiration for planned discharge setting. See evaluation for individualized goals. Note: SLP completed evaluation. Please refer to notes in EMR.      Oscar Olson M.A., CF-SLP #[id number]  Speech-Language Pathologist  Desk: [phone]

## 2023-10-05 NOTE — PROGRESS NOTES
Sukhdev states the upon admission pt code status was changed to [facility]. Will pass this on in report and have nurse tomorrow check with family.

## 2023-10-05 NOTE — CARE COORDINATION
Case Management Assessment  Initial Evaluation    Date/Time of Evaluation: 10/5/2023 10:59 AM  Assessment Completed by: Maya Palafox RN    If patient is discharged prior to next notation, then this note serves as note for discharge by case management. Patient Name: Emilia Pacheco                   YOB: 1933  Diagnosis: Pneumonia, bacterial [J15.9]  Hypoxia [R09.02]  RBBB [I45.10]  Altered mental status, unspecified altered mental status type [R41.82]  Pneumonia of left lower lobe due to infectious organism [J18.9]  Other fatigue [R53.83]                   Date / Time: 10/4/2023  3:03 PM    Patient Admission Status: Inpatient   Readmission Risk (Low < 19, Mod (19-27), High > 27): Readmission Risk Score: 14    Current PCP: Oral Kaur MD  PCP verified by CM? (P) Yes     10/05/23 1059   Service Assessment   Patient Orientation Person;Place   Cognition Alert   History Provided By Child/Family   Primary Caregiver Other (Comment)  ([facility])   Accompanied By/Relationship daughterEmile Members; Other (Comment)  (ECF caregivers)   Patient's Essie is: Named in [address]   PCP Verified by CM Yes   Last Visit to PCP Within last 3 months   Prior Functional Level Assistance with the following:;Bathing;Dressing; Toileting;Mobility   Current Functional Level Assistance with the following:;Bathing;Dressing; Toileting;Mobility   Can patient return to prior living arrangement Yes   Ability to make needs known: Poor   Family able to assist with home care needs: No   Would you like for me to discuss the discharge plan with any other family members/significant others, and if so, who?  Yes  (daughter, Marquis Land)   [address] [place] [address]/Home Care  [facility])   CM/SW Referral ADLs/IADLs  (Fall, PNA, Hypoxia)   Discharge Planning   Type of Residence [address] [facility] discuss the discharge plan with any other family members/significant others, and if so, who? (P) Yes (daughter, Tania Rouse)  Plans to Return to Present Housing: (P) Yes  Other Identified Issues/Barriers to RETURNING to current housing: NONE  Potential Assistance needed at discharge: (P) N/A            Potential DME:    Patient expects to discharge to: (P) Long-term care  Plan for transportation at discharge:      Financial    Payor: Pita Raymond / Plan: MEDICARE PART A AND B / Product Type: *No Product type* /     Does insurance require precert for SNF: NO    Potential assistance Purchasing Medications: (P) No  Meds-to-Beds request: No    No Pharmacies Listed    Notes:    Factors facilitating achievement of predicted outcomes: Family support, Caregiver support, Pleasant, and Has needed Durable Medical Equipment at home    Barriers to discharge: Medical complications    Additional Case Management Notes: Chart reviewed. Met with pt and daughter  at bedside and explained the role of the CM. Plan: [address] [facility] w/ Aniyah to confirm bed hold status and request current MAR. +CM following      The Plan for Transition of Care is related to the following treatment goals of Pneumonia, bacterial [J15.9]  Hypoxia [R09.02]  RBBB [I45.10]  Altered mental status, unspecified altered mental status type [R41.82]  Pneumonia of left lower lobe due to infectious organism [J18.9]  Other fatigue [L01.05]    IF APPLICABLE: The Patient and/or patient representative Raisa Graham and his family were provided with a choice of provider and agrees with the discharge plan. Freedom of choice list with basic dialogue that supports the patient's individualized plan of care/goals and shares the quality data associated with the providers was provided to:     Patient Representative Name:       The Patient and/or Patient Representative Agree with the Discharge Plan?       Sanjay Tuttle RN  Case Management Department  Ph: [phone]

## 2023-10-05 NOTE — PROGRESS NOTES
Inpatient Physical Therapy Evaluation & Treatment    Unit: [address]  Date:  10/5/2023  Patient Name:    Neva Pollock  Admitting diagnosis:  Pneumonia, bacterial [J15.9]  Hypoxia [R09.02]  RBBB [I45.10]  Altered mental status, unspecified altered mental status type [R41.82]  Pneumonia of left lower lobe due to infectious organism [J18.9]  Other fatigue [R53.83]  Admit Date:  10/4/2023  Precautions/Restrictions/WB Status/ Lines/ Wounds/ Oxygen: Fall risk, Bed/chair alarm, Lines (IV, Supplemental O2 (2L), and external catheter), Confusion, [place] (hard of hearing), and Telesitter      Pt seen for cotreatment this date due to patient safety, patient endurance, acute illness/injury, limited functional status information, and cognitive status    Treatment Time:  8256- 6559  Treatment Number:  1   Timed Code Treatment Minutes: 26 minutes  Total Treatment Minutes:  36  minutes    Patient Stated Goals for Therapy: none stated          Discharge Recommendations: SNF  DME needs for discharge: Defer to facility       Therapy recommendation for EMS Transport: requires transport by cot due to pt needs A x 2 for safe transfers    Therapy recommendations for staff:   Assist of 2 for transfers with use of rolling walker (RW) and gait belt to/from [facility]  to/from chair    History of Present Illness:   Per Dr. Rickey Canela H&P:   Pt is a 80 y.o. male who presented to [facility] with past medical history of type 2 diabetes, hypertension, hypothyroidism presented to ED due to x-ray acute encephalopathy     Patient came from a nursing home due to worsening acute encephalopathy was noted to have left facial droop yesterday and this was a new finding. Patient is on comfort care and is DNR CC at the facility and was having very difficulty with eating food and drinking better. Patient also had a fall yesterday with unclear etiology as it was unwitnessed. In the ED patient was noted to have hypoglycemia and thus will start on D5 with improvement. conducive to patient recovery, and limited safety awareness. Goals : To be met in 3 visits:  1). Independent with LE Ex x 10 reps  2). Sit to/from stand: CGA  3). Bed to chair: Min A       To be met in 6 visits:  1). Supine to/from sit: SBA  2). Sit to/from stand: CGA  3). Bed to chair: CGA  4). Gait: Ambulate  15 ft.   with Min A  and use of LRAD (least restrictive assistive device)  5). Tolerate B LE exercises 3 sets of 10-15 reps      Rehabilitation Potential: Fair  Strengths for achieving goals include:   PLOF and Pt cooperative   Barriers to achieving goals include:    Complexity of condition, Chronicity of condition, Weakness, and Impaired cognition    Plan    To be seen 3-5 x / week  while in acute care setting for therapeutic exercises, bed mobility, transfers, progressive gait training, balance training, and family/patient education. Signature: Sean Bradford PT     If patient discharges from this facility prior to next visit, this note will serve as the Discharge Summary.

## 2023-10-06 PROBLEM — R09.02 HYPOXIA: Status: ACTIVE | Noted: 2023-10-06

## 2023-10-06 LAB
BASOPHILS # BLD: 0 K/UL (ref 0–0.2)
BASOPHILS NFR BLD: 0.3 %
DEPRECATED RDW RBC AUTO: 14.1 % (ref 12.4–15.4)
EOSINOPHIL # BLD: 0.6 K/UL (ref 0–0.6)
EOSINOPHIL NFR BLD: 5.7 %
GLUCOSE BLD-MCNC: 107 MG/DL (ref 70–99)
GLUCOSE BLD-MCNC: 108 MG/DL (ref 70–99)
GLUCOSE BLD-MCNC: 137 MG/DL (ref 70–99)
GLUCOSE BLD-MCNC: 84 MG/DL (ref 70–99)
HCT VFR BLD AUTO: 37.9 % (ref 40.5–52.5)
HGB BLD-MCNC: 12.9 G/DL (ref 13.5–17.5)
LYMPHOCYTES # BLD: 1.4 K/UL (ref 1–5.1)
LYMPHOCYTES NFR BLD: 12.8 %
MCH RBC QN AUTO: 30.4 PG (ref 26–34)
MCHC RBC AUTO-ENTMCNC: 34 G/DL (ref 31–36)
MCV RBC AUTO: 89.4 FL (ref 80–100)
MONOCYTES # BLD: 1.1 K/UL (ref 0–1.3)
MONOCYTES NFR BLD: 10.2 %
NEUTROPHILS # BLD: 7.5 K/UL (ref 1.7–7.7)
NEUTROPHILS NFR BLD: 71 %
PERFORMED ON: ABNORMAL
PERFORMED ON: NORMAL
PLATELET # BLD AUTO: 187 K/UL (ref 135–450)
PMV BLD AUTO: 8.1 FL (ref 5–10.5)
RBC # BLD AUTO: 4.24 M/UL (ref 4.2–5.9)
WBC # BLD AUTO: 10.6 K/UL (ref 4–11)

## 2023-10-06 PROCEDURE — 99233 SBSQ HOSP IP/OBS HIGH 50: CPT | Performed by: INTERNAL MEDICINE

## 2023-10-06 PROCEDURE — 36415 COLL VENOUS BLD VENIPUNCTURE: CPT

## 2023-10-06 PROCEDURE — 94761 N-INVAS EAR/PLS OXIMETRY MLT: CPT

## 2023-10-06 PROCEDURE — 2580000003 HC RX 258: Performed by: INTERNAL MEDICINE

## 2023-10-06 PROCEDURE — 6360000002 HC RX W HCPCS: Performed by: INTERNAL MEDICINE

## 2023-10-06 PROCEDURE — 85025 COMPLETE CBC W/AUTO DIFF WBC: CPT

## 2023-10-06 PROCEDURE — 1200000000 HC SEMI PRIVATE

## 2023-10-06 PROCEDURE — 2700000000 HC OXYGEN THERAPY PER DAY

## 2023-10-06 PROCEDURE — 6370000000 HC RX 637 (ALT 250 FOR IP): Performed by: INTERNAL MEDICINE

## 2023-10-06 RX ORDER — LANOLIN ALCOHOL/MO/W.PET/CERES
3 CREAM (GRAM) TOPICAL NIGHTLY PRN
Status: DISCONTINUED | OUTPATIENT
Start: 2023-10-06 | End: 2023-10-07 | Stop reason: HOSPADM

## 2023-10-06 RX ORDER — AMLODIPINE BESYLATE 5 MG/1
5 TABLET ORAL DAILY
Status: DISCONTINUED | OUTPATIENT
Start: 2023-10-06 | End: 2023-10-07 | Stop reason: HOSPADM

## 2023-10-06 RX ORDER — DONEPEZIL HYDROCHLORIDE 5 MG/1
5 TABLET, FILM COATED ORAL NIGHTLY
Status: DISCONTINUED | OUTPATIENT
Start: 2023-10-06 | End: 2023-10-07 | Stop reason: HOSPADM

## 2023-10-06 RX ORDER — MEMANTINE HYDROCHLORIDE 5 MG/1
5 TABLET ORAL DAILY
Status: DISCONTINUED | OUTPATIENT
Start: 2023-10-06 | End: 2023-10-07 | Stop reason: HOSPADM

## 2023-10-06 RX ADMIN — DONEPEZIL HYDROCHLORIDE 5 MG: 5 TABLET, FILM COATED ORAL at 21:06

## 2023-10-06 RX ADMIN — CEFEPIME 2000 MG: 2 INJECTION, POWDER, FOR SOLUTION INTRAVENOUS at 13:41

## 2023-10-06 RX ADMIN — Medication 3 MG: at 21:06

## 2023-10-06 RX ADMIN — AMLODIPINE BESYLATE 5 MG: 5 TABLET ORAL at 11:15

## 2023-10-06 RX ADMIN — Medication 10 ML: at 21:06

## 2023-10-06 RX ADMIN — LEVOTHYROXINE SODIUM 125 MCG: 50 TABLET ORAL at 05:53

## 2023-10-06 RX ADMIN — ACETAMINOPHEN 650 MG: 325 TABLET ORAL at 21:09

## 2023-10-06 RX ADMIN — CEFEPIME 2000 MG: 2 INJECTION, POWDER, FOR SOLUTION INTRAVENOUS at 02:45

## 2023-10-06 RX ADMIN — VANCOMYCIN HYDROCHLORIDE 1000 MG: 1 INJECTION, POWDER, LYOPHILIZED, FOR SOLUTION INTRAVENOUS at 11:35

## 2023-10-06 RX ADMIN — MEMANTINE HYDROCHLORIDE 5 MG: 5 TABLET ORAL at 11:15

## 2023-10-06 RX ADMIN — SERTRALINE HYDROCHLORIDE 25 MG: 50 TABLET ORAL at 11:15

## 2023-10-06 RX ADMIN — ENOXAPARIN SODIUM 40 MG: 100 INJECTION SUBCUTANEOUS at 08:24

## 2023-10-06 ASSESSMENT — PAIN DESCRIPTION - LOCATION: LOCATION: GENERALIZED

## 2023-10-06 ASSESSMENT — PAIN - FUNCTIONAL ASSESSMENT: PAIN_FUNCTIONAL_ASSESSMENT: ACTIVITIES ARE NOT PREVENTED

## 2023-10-06 ASSESSMENT — PAIN DESCRIPTION - ORIENTATION: ORIENTATION: OTHER (COMMENT)

## 2023-10-06 ASSESSMENT — PAIN SCALES - GENERAL: PAINLEVEL_OUTOF10: 4

## 2023-10-06 NOTE — CARE COORDINATION
INTERDISCIPLINARY PLAN OF CARE CONFERENCE    Date/Time: 10/6/2023 2:25 PM  Completed by: Maya Palafox RN, Case Management      Patient Name:  Emilia Pacheco  YOB: 1933  Admitting Diagnosis: Pneumonia, bacterial [J15.9]  Hypoxia [R09.02]  RBBB [I45.10]  Altered mental status, unspecified altered mental status type [R41.82]  Pneumonia of left lower lobe due to infectious organism [J18.9]  Other fatigue [R53.83]     Admit Date/Time:  10/4/2023  3:03 PM    Chart reviewed. Interdisciplinary team contacted or reviewed plan related to patient progress and discharge plans. Disciplines included Case Management, Nursing, and Dietitian. Current Status: IP 10/04/2023  PT/OT recommendation for discharge plan of care:   Discharge Recommendations: SNF  DME needs for discharge: Defer to facility     Expected D/C Disposition: Nursing Home    Discharge Plan Comments: Chart reviewed. From LTC at [facility], bed held. Plan: return to [facility] on oral antibiotics.  +CM following    Home O2 in place on admit: No (has supplemental O2 since admission)  Pt informed of need to bring portable home O2 tank on day of discharge for nursing to connect prior to leaving:  Not Indicated  Verbalized agreement/Understanding:  Not Indicated

## 2023-10-06 NOTE — PROGRESS NOTES
Received report given @ bedside from [facility], for transferral of care. Pt sitting up in chair. Chair alarm activated. Call light in reach.

## 2023-10-06 NOTE — PROGRESS NOTES
Vancomycin Day: 2/7  Current Regimen: 1000 mg IV every 24 hours    Patient's labs, cultures, vitals, and vancomycin regimen reviewed.    SCr stable  U/O not measured/well documented  [employer] updated    Plan:   Order level for 10/7 at 11am, will order BMP for nirali Ferguson Asp Pharm D 10/6/945219:15 AM  .

## 2023-10-06 NOTE — PROGRESS NOTES
10/06/23 1415   Encounter Summary   Encounter Overview/Reason  Initial Encounter   Service Provided For: Patient   Referral/Consult From: Rounding   Support System Unknown   Last Encounter  10/06/23  (Anita Heath made initial visit; patient not very responsive)   Assessment/Intervention/Outcome   Assessment Unable to assess

## 2023-10-06 NOTE — PROGRESS NOTES
13.8* 10.6   HGB 12.4* 12.9* 12.9*   HCT 36.9* 38.3* 37.9*   MCV 90.6 89.9 89.4    182 187       Recent Labs     10/03/23  1602 10/04/23  1502 10/05/23  0553   NA  --  142 139   K  --  3.8 4.0   CL  --  105 104   CO2  --  29 24   PHOS 3.5  --   --    BUN  --  22* 17   CREATININE  --  1.3 1.1       No results for input(s): \"CKTOTAL\", \"CKMB\", \"CKMBINDEX\", \"TROPONINI\" in the last 72 hours. Coagulation:   Lab Results   Component Value Date/Time    INR 1.15 10/04/2023 03:02 PM     Cardiac markers:   Lab Results   Component Value Date/Time    TROPONINI <0.01 10/13/2020 10:55 PM         Lab Results   Component Value Date    ALT 8 (L) 10/05/2023    AST 14 (L) 10/05/2023    ALKPHOS 86 10/05/2023    BILITOT 0.4 10/05/2023       Lab Results   Component Value Date    INR 1.15 10/04/2023    INR 1.14 10/29/2018    PROTIME 14.7 10/04/2023    PROTIME 13.0 10/29/2018       Radiology      FL MODIFIED BARIUM SWALLOW W VIDEO   Final Result   Swallowing mechanism grossly within normal limits without evidence of   aspiration. Please see separate speech pathology report for full discussion of findings   and recommendations. CT HEAD WO CONTRAST   Final Result   No acute intracranial abnormality. Age-appropriate atrophy and small-vessel   disease ischemic changes redemonstrated. XR CHEST (2 VW)   Final Result   Some hypoventilatory changes in the left lung base. A pneumonia cannot be   excluded. Cultures  Blood- NGTD  Sputum - pending  Urine pending    Assessment & Plan:    Acute metabolic encephalopathy  Likely from pneumonia   Has baseline dementia.  Improved with holding home meds and supportive care   CT head negative  Improved and back to baseline today      Pneumonia - possible  Cxr with possible left lung pna  Procal elevated, wbc high   Started on cefepime and vanc D 3  Obtained SLP eval and adjusted diet  Aspiration precautions  Mild hypoxia on 2 L , wean as able  No fevers, mentation

## 2023-10-07 VITALS
BODY MASS INDEX: 21.94 KG/M2 | TEMPERATURE: 97.6 F | HEIGHT: 72 IN | WEIGHT: 162 LBS | OXYGEN SATURATION: 92 % | HEART RATE: 65 BPM | SYSTOLIC BLOOD PRESSURE: 160 MMHG | DIASTOLIC BLOOD PRESSURE: 69 MMHG | RESPIRATION RATE: 18 BRPM

## 2023-10-07 PROBLEM — G89.29 CHRONIC IDIOPATHIC PAIN SYNDROME: Status: ACTIVE | Noted: 2023-10-07

## 2023-10-07 PROBLEM — R41.82 ALTERED MENTAL STATUS: Status: ACTIVE | Noted: 2023-10-07

## 2023-10-07 LAB
ANION GAP SERPL CALCULATED.3IONS-SCNC: 12 MMOL/L (ref 3–16)
BASOPHILS # BLD: 0 K/UL (ref 0–0.2)
BASOPHILS NFR BLD: 0.4 %
BUN SERPL-MCNC: 22 MG/DL (ref 7–20)
CALCIUM SERPL-MCNC: 9.2 MG/DL (ref 8.3–10.6)
CHLORIDE SERPL-SCNC: 103 MMOL/L (ref 99–110)
CO2 SERPL-SCNC: 25 MMOL/L (ref 21–32)
CREAT SERPL-MCNC: 1 MG/DL (ref 0.8–1.3)
DEPRECATED RDW RBC AUTO: 13.9 % (ref 12.4–15.4)
EOSINOPHIL # BLD: 0.7 K/UL (ref 0–0.6)
EOSINOPHIL NFR BLD: 8 %
GFR SERPLBLD CREATININE-BSD FMLA CKD-EPI: >60 ML/MIN/{1.73_M2}
GLUCOSE BLD-MCNC: 107 MG/DL (ref 70–99)
GLUCOSE BLD-MCNC: 95 MG/DL (ref 70–99)
GLUCOSE SERPL-MCNC: 107 MG/DL (ref 70–99)
HCT VFR BLD AUTO: 37.6 % (ref 40.5–52.5)
HGB BLD-MCNC: 12.9 G/DL (ref 13.5–17.5)
LYMPHOCYTES # BLD: 1.3 K/UL (ref 1–5.1)
LYMPHOCYTES NFR BLD: 14.9 %
MCH RBC QN AUTO: 30.6 PG (ref 26–34)
MCHC RBC AUTO-ENTMCNC: 34.5 G/DL (ref 31–36)
MCV RBC AUTO: 88.8 FL (ref 80–100)
MONOCYTES # BLD: 1 K/UL (ref 0–1.3)
MONOCYTES NFR BLD: 11.7 %
NEUTROPHILS # BLD: 5.5 K/UL (ref 1.7–7.7)
NEUTROPHILS NFR BLD: 65 %
PERFORMED ON: ABNORMAL
PERFORMED ON: NORMAL
PLATELET # BLD AUTO: 198 K/UL (ref 135–450)
PMV BLD AUTO: 8.2 FL (ref 5–10.5)
POTASSIUM SERPL-SCNC: 3.5 MMOL/L (ref 3.5–5.1)
RBC # BLD AUTO: 4.23 M/UL (ref 4.2–5.9)
SODIUM SERPL-SCNC: 140 MMOL/L (ref 136–145)
VANCOMYCIN TROUGH SERPL-MCNC: 9.1 UG/ML (ref 10–20)
WBC # BLD AUTO: 8.4 K/UL (ref 4–11)

## 2023-10-07 PROCEDURE — 80202 ASSAY OF VANCOMYCIN: CPT

## 2023-10-07 PROCEDURE — 36415 COLL VENOUS BLD VENIPUNCTURE: CPT

## 2023-10-07 PROCEDURE — 2580000003 HC RX 258: Performed by: INTERNAL MEDICINE

## 2023-10-07 PROCEDURE — 85025 COMPLETE CBC W/AUTO DIFF WBC: CPT

## 2023-10-07 PROCEDURE — 6370000000 HC RX 637 (ALT 250 FOR IP): Performed by: INTERNAL MEDICINE

## 2023-10-07 PROCEDURE — 80048 BASIC METABOLIC PNL TOTAL CA: CPT

## 2023-10-07 PROCEDURE — 99239 HOSP IP/OBS DSCHRG MGMT >30: CPT | Performed by: INTERNAL MEDICINE

## 2023-10-07 PROCEDURE — 6360000002 HC RX W HCPCS: Performed by: INTERNAL MEDICINE

## 2023-10-07 RX ORDER — HYDROCODONE BITARTRATE AND ACETAMINOPHEN 5; 325 MG/1; MG/1
1 TABLET ORAL EVERY 8 HOURS PRN
Qty: 9 TABLET | Refills: 0 | Status: SHIPPED | OUTPATIENT
Start: 2023-10-07 | End: 2023-10-10

## 2023-10-07 RX ORDER — SERTRALINE HYDROCHLORIDE 25 MG/1
25 TABLET, FILM COATED ORAL DAILY
Qty: 30 TABLET | Refills: 3
Start: 2023-10-08

## 2023-10-07 RX ORDER — LEVOFLOXACIN 500 MG/1
500 TABLET, FILM COATED ORAL DAILY
Qty: 5 TABLET | Refills: 0
Start: 2023-10-07 | End: 2023-10-12

## 2023-10-07 RX ADMIN — VANCOMYCIN HYDROCHLORIDE 1000 MG: 1 INJECTION, POWDER, LYOPHILIZED, FOR SOLUTION INTRAVENOUS at 12:06

## 2023-10-07 RX ADMIN — LEVOTHYROXINE SODIUM 125 MCG: 50 TABLET ORAL at 06:00

## 2023-10-07 RX ADMIN — SERTRALINE HYDROCHLORIDE 25 MG: 50 TABLET ORAL at 09:04

## 2023-10-07 RX ADMIN — ENOXAPARIN SODIUM 40 MG: 100 INJECTION SUBCUTANEOUS at 09:04

## 2023-10-07 RX ADMIN — MEMANTINE HYDROCHLORIDE 5 MG: 5 TABLET ORAL at 09:05

## 2023-10-07 RX ADMIN — ACETAMINOPHEN 650 MG: 325 TABLET ORAL at 12:03

## 2023-10-07 RX ADMIN — AMLODIPINE BESYLATE 5 MG: 5 TABLET ORAL at 09:05

## 2023-10-07 RX ADMIN — CEFEPIME 2000 MG: 2 INJECTION, POWDER, FOR SOLUTION INTRAVENOUS at 03:03

## 2023-10-07 ASSESSMENT — PAIN SCALES - GENERAL
PAINLEVEL_OUTOF10: 0
PAINLEVEL_OUTOF10: 5

## 2023-10-07 ASSESSMENT — PAIN DESCRIPTION - LOCATION: LOCATION: FOOT

## 2023-10-07 ASSESSMENT — PAIN DESCRIPTION - ORIENTATION: ORIENTATION: RIGHT

## 2023-10-07 NOTE — PLAN OF CARE
Problem: Discharge Planning  Goal: Discharge to home or other facility with appropriate resources  97/0/2744 4120 by FELICITY CALVILLO  Outcome: Progressing  10/6/2023 2111 by Jose You RN  Outcome: Progressing     Problem: Safety - Medical Restraint  Goal: Remains free of injury from restraints (Restraint for Interference with Medical Device)  Description: INTERVENTIONS:  1. Determine that other, less restrictive measures have been tried or would not be effective before applying the restraint  2. Evaluate the patient's condition at the time of restraint application  3. Inform patient/family regarding the reason for restraint  4. Q2H: Monitor safety, psychosocial status, comfort, nutrition and hydration  83/4/5351 8546 by FELICITY CALVILLO  Outcome: Progressing  10/6/2023 2111 by Jose You RN  Outcome: Progressing     Problem: Safety - Adult  Goal: Free from fall injury  02/1/1513 2375 by FELICITY CALVILLO  Outcome: Progressing  10/6/2023 2111 by Jose You RN  Outcome: Progressing     Problem: ABCDS Injury Assessment  Goal: Absence of physical injury  07/0/6830 2570 by FELICITY CALVILLO  Outcome: Progressing  10/6/2023 2111 by Jose You RN  Outcome: Progressing     Problem: Skin/Tissue Integrity  Goal: Absence of new skin breakdown  Description: 1. Monitor for areas of redness and/or skin breakdown  2. Assess vascular access sites hourly  3. Every 4-6 hours minimum:  Change oxygen saturation probe site  4. Every 4-6 hours:  If on nasal continuous positive airway pressure, respiratory therapy assess nares and determine need for appliance change or resting period. 67/9/4329 8708 by FELICITY CALVILLO  Outcome: Progressing  10/6/2023 2111 by Jose You RN  Outcome: Progressing     Problem: Confusion  Goal: Confusion, delirium, dementia, or psychosis is improved or at baseline  Description: INTERVENTIONS:  1.  Assess for possible contributors to thought disturbance, including medications, impaired vision or hearing, underlying metabolic abnormalities, dehydration, psychiatric diagnoses, and notify attending LIP  2. [place] high risk fall precautions, as indicated  3. Provide frequent short contacts to provide reality reorientation, refocusing and direction  4. Decrease environmental stimuli, including noise as appropriate  5. Monitor and intervene to maintain adequate nutrition, hydration, elimination, sleep and activity  6. If unable to ensure safety without constant attention obtain sitter and review sitter guidelines with assigned personnel  7.  Initiate Psychosocial CNS and Spiritual Care consult, as indicated  60/3/7991 3378 by FELICITY CALVILLO  Outcome: Progressing  10/6/2023 2111 by Glenn Drake, RN  Outcome: Progressing     Problem: Pain  Goal: Verbalizes/displays adequate comfort level or baseline comfort level  59/6/9092 7394 by FELICITY CALVILLO  Outcome: Progressing  10/6/2023 2111 by Glenn Drake, RN  Outcome: Progressing     Problem: Chronic Conditions and Co-morbidities  Goal: Patient's chronic conditions and co-morbidity symptoms are monitored and maintained or improved  12/1/3051 8729 by FELICITY CALVILLO  Outcome: Progressing  10/6/2023 2111 by Glenn Drake, RN  Outcome: Progressing

## 2023-10-07 NOTE — CARE COORDINATION
CASE MANAGEMENT DISCHARGE SUMMARY      Discharge to: [address] completed: [address] [facility] Exemption Notification (HENS) completed: N/A    IMM given: (date) 10/7/23    New Durable Medical Equipment ordered/agency: Provided by facility    Transportation:    Medical Transport explained to pt/family. Pt/family voice no agency preference. Agency used: [address] [place] [address] [email or web address]   Ambulance form completed: Yes    Confirmed discharge plan with: Back to [facility]     Patient: yes     Family:  yes    Name: Mazin Mullins number: [phone]     Facility/Agency, name: [address] [place] [address] faxed [phone]   Phone number for report to facility: [phone]     RN, name: Simone Bartlett    Note: Discharging nurse to complete MIGUEL, reconcile AVS, and place final copy with patient's discharge packet. RN to ensure that written prescriptions for  Level II medications are sent with patient to the facility as per protocol. Lam Yao RN

## 2023-10-07 NOTE — PLAN OF CARE
Problem: Discharge Planning  Goal: Discharge to home or other facility with appropriate resources  29/5/8797 4801 by FELICITY CALVILLO  Outcome: Completed  58/0/6695 2271 by FELICITY CALVILLO  Outcome: Progressing     Problem: Safety - Medical Restraint  Goal: Remains free of injury from restraints (Restraint for Interference with Medical Device)  Description: INTERVENTIONS:  1. Determine that other, less restrictive measures have been tried or would not be effective before applying the restraint  2. Evaluate the patient's condition at the time of restraint application  3. Inform patient/family regarding the reason for restraint  4. Q2H: Monitor safety, psychosocial status, comfort, nutrition and hydration  01/1/8414 2390 by FELICITY CALVILLO  Outcome: Completed  65/8/5916 2325 by FELICITY CALVILLO  Outcome: Progressing     Problem: Safety - Adult  Goal: Free from fall injury  52/4/6127 9635 by FELICITY CALVILLO  Outcome: Completed  89/4/1731 6329 by FELICITY CALVILLO  Outcome: Progressing     Problem: ABCDS Injury Assessment  Goal: Absence of physical injury  45/4/7814 3431 by FELICITY CALVILLO  Outcome: Completed  94/7/1655 9225 by FELICITY CALVILLO  Outcome: Progressing     Problem: Skin/Tissue Integrity  Goal: Absence of new skin breakdown  Description: 1. Monitor for areas of redness and/or skin breakdown  2. Assess vascular access sites hourly  3. Every 4-6 hours minimum:  Change oxygen saturation probe site  4. Every 4-6 hours:  If on nasal continuous positive airway pressure, respiratory therapy assess nares and determine need for appliance change or resting period. 46/2/5177 9187 by FELICITY CALVILLO  Outcome: Completed  70/0/6588 6483 by FELICITY CALVILLO  Outcome: Progressing     Problem: Confusion  Goal: Confusion, delirium, dementia, or psychosis is improved or at baseline  Description: INTERVENTIONS:  1.  Assess for possible contributors to thought disturbance, including medications, impaired vision or hearing, underlying metabolic abnormalities, dehydration, psychiatric diagnoses, and notify attending LIP  2. [place] high risk fall precautions, as indicated  3. Provide frequent short contacts to provide reality reorientation, refocusing and direction  4. Decrease environmental stimuli, including noise as appropriate  5. Monitor and intervene to maintain adequate nutrition, hydration, elimination, sleep and activity  6. If unable to ensure safety without constant attention obtain sitter and review sitter guidelines with assigned personnel  7.  Initiate Psychosocial CNS and Spiritual Care consult, as indicated  16/4/9650 5553 by FELICITY CALVILLO  Outcome: Completed  68/9/1496 9920 by FELICITY CALVILLO  Outcome: Progressing     Problem: Pain  Goal: Verbalizes/displays adequate comfort level or baseline comfort level  01/2/7845 4248 by FELICITY CALVILLO  Outcome: Completed  88/7/8303 6446 by FELICITY CALVILLO  Outcome: Progressing     Problem: Chronic Conditions and Co-morbidities  Goal: Patient's chronic conditions and co-morbidity symptoms are monitored and maintained or improved  64/4/6927 8810 by FELICITY CALVILLO  Outcome: Completed  18/1/8700 1328 by FELICITY CALVILLO  Outcome: Progressing

## 2023-10-07 NOTE — PROGRESS NOTES
Pt leaving via EMS to [facility]. Discharge instructions given. Pt voiced understanding. Copy of discharge and scripts with patient. Iv removed. CP and PE completed. No further needs at discharge. Pt leaving with all personal belonging.

## 2023-10-07 NOTE — PROGRESS NOTES
Shift assessment complete. See flow sheet. Due medications administered per MD orders. See MAR. Vital signs stable. Patient is alert and oriented x 2, disoriented to time and situation. Pt denies any present needs/concerns. Call light explained and in reach.

## 2023-10-07 NOTE — PLAN OF CARE
Problem: Discharge Planning  Goal: Discharge to home or other facility with appropriate resources  Outcome: Progressing     Problem: Safety - Medical Restraint  Goal: Remains free of injury from restraints (Restraint for Interference with Medical Device)  Description: INTERVENTIONS:  1. Determine that other, less restrictive measures have been tried or would not be effective before applying the restraint  2. Evaluate the patient's condition at the time of restraint application  3. Inform patient/family regarding the reason for restraint  4. Q2H: Monitor safety, psychosocial status, comfort, nutrition and hydration  Outcome: Progressing     Problem: Safety - Adult  Goal: Free from fall injury  Outcome: Progressing     Problem: ABCDS Injury Assessment  Goal: Absence of physical injury  Outcome: Progressing     Problem: Skin/Tissue Integrity  Goal: Absence of new skin breakdown  Description: 1. Monitor for areas of redness and/or skin breakdown  2. Assess vascular access sites hourly  3. Every 4-6 hours minimum:  Change oxygen saturation probe site  4. Every 4-6 hours:  If on nasal continuous positive airway pressure, respiratory therapy assess nares and determine need for appliance change or resting period. Outcome: Progressing     Problem: Confusion  Goal: Confusion, delirium, dementia, or psychosis is improved or at baseline  Description: INTERVENTIONS:  1. Assess for possible contributors to thought disturbance, including medications, impaired vision or hearing, underlying metabolic abnormalities, dehydration, psychiatric diagnoses, and notify attending LIP  2. [place] high risk fall precautions, as indicated  3. Provide frequent short contacts to provide reality reorientation, refocusing and direction  4. Decrease environmental stimuli, including noise as appropriate  5. Monitor and intervene to maintain adequate nutrition, hydration, elimination, sleep and activity  6.  If unable to ensure safety without

## 2023-10-09 ENCOUNTER — TELEPHONE (OUTPATIENT)
Dept: FAMILY MEDICINE CLINIC | Age: 88
End: 2023-10-09

## 2023-10-09 NOTE — TELEPHONE ENCOUNTER
[address] [facility] Transitions Initial Follow Up Call    Outreach made within 2 business days of discharge: Yes    Patient: Gus Melo Patient : 1/10/1933   MRN: <[id number]>  Reason for Admission: There are no discharge diagnoses documented for the most recent discharge. Discharge Date: 10/7/23       Spoke with: Thelmacoletteosiel Oliva [phone]  Patient is long term resident at [facility]    Discharge department/facility: [facility]  Non-face-to-face services provided:  Obtained and reviewed discharge summary and/or continuity of care documents    TCM Interactive Patient Contact:  Was patient able to fill all prescriptions: Yes  Was patient instructed to bring all medications to the follow-up visit: No: Will be seen at [facility] , not at PCP office. Is patient taking all medications as directed in the discharge summary? Yes  Does patient understand their discharge instructions: No: [facility] staff is ware of all d/c instrctions  Does patient have questions or concerns that need addressed prior to 7-14 day follow up office visit: no    No appointment Scheduled with PCP . Long term resident at [facility]    Follow Up  No future appointments.     Marilynn Mcintosh RN

## 2023-10-10 NOTE — PROGRESS NOTES
Physician Progress Note      Franco Guillen  CSN #:                  [id number]  :                       1/10/1933  ADMIT DATE:       10/4/2023 3:03 PM  [facility] DATE:        10/7/2023 4:10 PM  RESPONDING  PROVIDER #:        Luiza Tapia MD          QUERY TEXT:    Pt admitted with altered mental status. Possible pneumonia is documented. Pt   with hx of CKD, dementia, and DM. MBS eval-Swallowing mechanism grossly   within normal limits without evidence of aspiration. Treatment includes   cefepime and vanc. Please further specify the type of pneumonia being   treated: The medical record reflects the following:  Risk Factors: age, dementia, DM, CKD3, smoker  Clinical Indicators: Cxr with possible left lung pna, wbc-11.5 to 13.8, mild   hypoxia  Treatment: Speech eval with MBS, Cefipime, Vancomycin, imaging, oxygen    Thank you,  Candis Nunn RN,BSN,CCDS,CRCR  Options provided:  -- Gram negative pneumonia  -- Gram positive pneumonia  -- Aspiration pneumonia  -- Other - I will add my own diagnosis  -- Disagree - Not applicable / Not valid  -- Disagree - Clinically unable to determine / Unknown  -- Refer to Clinical Documentation Reviewer    PROVIDER RESPONSE TEXT:    This patient has gram negative pneumonia.     Query created by: Ashley Ann on 10/6/2023 1:41 PM      Electronically signed by:  Luiza Tapia MD 10/10/2023 2:29 PM

## 2024-01-18 ENCOUNTER — HOSPITAL ENCOUNTER (EMERGENCY)
Age: 89
Discharge: HOME | End: 2024-01-18
Attending: EMERGENCY MEDICINE
Payer: COMMERCIAL

## 2024-01-18 ENCOUNTER — APPOINTMENT (OUTPATIENT)
Dept: CT IMAGING | Age: 89
End: 2024-01-18
Attending: EMERGENCY MEDICINE
Payer: COMMERCIAL

## 2024-01-18 VITALS
OXYGEN SATURATION: 96 % | DIASTOLIC BLOOD PRESSURE: 78 MMHG | RESPIRATION RATE: 20 BRPM | TEMPERATURE: 98 F | HEART RATE: 76 BPM | HEIGHT: 72 IN | BODY MASS INDEX: 23.04 KG/M2 | SYSTOLIC BLOOD PRESSURE: 155 MMHG | WEIGHT: 170.1 LBS

## 2024-01-18 DIAGNOSIS — S09.90XA INJURY OF HEAD, INITIAL ENCOUNTER: Primary | ICD-10-CM
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
## 2024-01-18 PROCEDURE — 90471 IMMUNIZATION ADMIN: CPT | Performed by: EMERGENCY MEDICINE

## 2024-01-18 PROCEDURE — 72125 CT NECK SPINE W/O DYE: CPT

## 2024-01-18 PROCEDURE — 6360000002 HC RX W HCPCS: Performed by: EMERGENCY MEDICINE

## 2024-01-18 PROCEDURE — 99284 EMERGENCY DEPT VISIT MOD MDM: CPT

## 2024-01-18 PROCEDURE — 70450 CT HEAD/BRAIN W/O DYE: CPT

## 2024-01-18 PROCEDURE — 90715 TDAP VACCINE 7 YRS/> IM: CPT | Performed by: EMERGENCY MEDICINE

## 2024-01-18 RX ADMIN — TETANUS TOXOID, REDUCED DIPHTHERIA TOXOID AND ACELLULAR PERTUSSIS VACCINE, ADSORBED 0.5 ML: 5; 2.5; 8; 8; 2.5 SUSPENSION INTRAMUSCULAR at 18:58

## 2024-01-18 ASSESSMENT — LIFESTYLE VARIABLES
HOW MANY STANDARD DRINKS CONTAINING ALCOHOL DO YOU HAVE ON A TYPICAL DAY: PATIENT DOES NOT DRINK
HOW OFTEN DO YOU HAVE A DRINK CONTAINING ALCOHOL: NEVER

## 2024-01-18 ASSESSMENT — PAIN - FUNCTIONAL ASSESSMENT
PAIN_FUNCTIONAL_ASSESSMENT: NONE - DENIES PAIN
PAIN_FUNCTIONAL_ASSESSMENT: NONE - DENIES PAIN

## 2024-01-19 NOTE — ED PROVIDER NOTES
Patient presents with the above complaints and was signed out to me by the prior physician for final disposition.  I also examined the patient.  He is awake, alert, appropriate and nonfocal neurologic exam.    Labs Reviewed - No data to display     CT HEAD WO CONTRAST   Final Result   Stable appearance of the brain with no acute intracranial abnormality.         CT CERVICAL SPINE WO CONTRAST   Final Result   No acute abnormality of the cervical spine.              ED Course as of 01/18/24 1948   Thu Jan 18, 2024 1915 Patient signed out to me by the prior physician.  He had a mechanical fall where he hit his head with a scalp abrasion present.  Head and C-spine CT pending [AM]   1947 CT scans do not show evidence for injury.  Will plan discharge as long as patient is at his baseline ambulatory status [AM]      ED Course User Index  [AM] Bisi Piedra MD        Medications   Tetanus-Diphth-Acell Pertussis (BOOSTRIX) injection 0.5 mL (0.5 mLs IntraMUSCular Given 1/18/24 1858)          Impression:    ICD-10-CM    1. Injury of head, initial encounter  S09.90XA       2. Fall, initial encounter  W19.XXXA              Please note that this chart was created with the assistance of voice recognition software and may contain errors in word use, grammar, or punctuation.  For any questions, contact the dictating physician.       Bisi Piedra MD  01/18/24 1948    
Conditions: NA    Disposition Considerations: None    Critical Care: I personally saw the patient and independently provided 0 minutes of non-concurrent critical care out of the total shared critical care time excluding separately billable procedures.  I am the primary physician of Record.     FINAL IMPRESSION    1. Injury of head, initial encounter    2. Fall, initial encounter         DISPOSITION/PLAN   DISPOSITION         PATIENT REFERRED TO:   Edvin Corrigan MD  [address] [facility] [address]  [phone]           DISCHARGE MEDICATIONS:   New Prescriptions    No medications on file      DISCONTINUED MEDICATIONS:   Discontinued Medications    No medications on file            (Please note that portions of this note were completed with a voice recognition program.  Efforts were made to edit the dictations but occasionally words are mis-transcribed.)     William Puente MD (electronically signed)      Gagan Puente MD  01/18/24 5510

## 2024-04-23 ENCOUNTER — APPOINTMENT (OUTPATIENT)
Dept: CT IMAGING | Age: 89
End: 2024-04-23
Payer: COMMERCIAL

## 2024-04-23 ENCOUNTER — APPOINTMENT (OUTPATIENT)
Dept: GENERAL RADIOLOGY | Age: 89
End: 2024-04-23
Payer: COMMERCIAL

## 2024-04-23 ENCOUNTER — HOSPITAL ENCOUNTER (EMERGENCY)
Age: 89
Discharge: NURSING, REHAB OR LONG-TERM CARE | End: 2024-04-23
Attending: EMERGENCY MEDICINE
Payer: COMMERCIAL
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
## 2024-04-23 DIAGNOSIS — M54.50 ACUTE MIDLINE LOW BACK PAIN, UNSPECIFIED WHETHER SCIATICA PRESENT: ICD-10-CM

## 2024-04-23 DIAGNOSIS — S32.018A OTHER CLOSED FRACTURE OF FIRST LUMBAR VERTEBRA, INITIAL ENCOUNTER (HCC): ICD-10-CM

## 2024-04-23 DIAGNOSIS — S32.038A OTHER CLOSED FRACTURE OF THIRD LUMBAR VERTEBRA, INITIAL ENCOUNTER (HCC): Primary | ICD-10-CM

## 2024-04-23 PROCEDURE — 72125 CT NECK SPINE W/O DYE: CPT

## 2024-04-23 PROCEDURE — 70450 CT HEAD/BRAIN W/O DYE: CPT

## 2024-04-23 PROCEDURE — 99284 EMERGENCY DEPT VISIT MOD MDM: CPT

## 2024-04-23 PROCEDURE — 72128 CT CHEST SPINE W/O DYE: CPT

## 2024-04-23 PROCEDURE — 72131 CT LUMBAR SPINE W/O DYE: CPT

## 2024-04-23 PROCEDURE — 74018 RADEX ABDOMEN 1 VIEW: CPT

## 2024-04-23 ASSESSMENT — ENCOUNTER SYMPTOMS: BACK PAIN: 1

## 2024-04-23 ASSESSMENT — LIFESTYLE VARIABLES
HOW OFTEN DO YOU HAVE A DRINK CONTAINING ALCOHOL: NEVER
HOW MANY STANDARD DRINKS CONTAINING ALCOHOL DO YOU HAVE ON A TYPICAL DAY: PATIENT DOES NOT DRINK

## 2024-04-23 NOTE — ED NOTES
Patient falling asleep in triage, possibly from pain meds given by EMS. Sp02 83 on RA, stating 90 after going on 5L NC

## 2024-04-23 NOTE — ED PROVIDER NOTES
Emergency Department Attending Physician Note  Location: [facility] ED  4/23/2024       Pt Name: Benedicto Webster  MRN: [id number]  Birthdate 1/10/1933    Date of evaluation: 4/23/2024  Provider: ERASMO OBANDO DO  PCP: Edvin Corrigan MD    Note Started: 6:48 PM EDT 4/23/24    CHIEF COMPLAINT  Chief Complaint   Patient presents with    Fall     Patient fell at nursing home on 04/19, provider there didn't like x rays. Sent here for new ones. History of dementia. 50mcg fentanyl given in route. Nursing home did state back fractures, POSSIBLY stable        ROOM: 9    HISTORY OF PRESENT ILLNESS:  History obtained by EMS/ nursing home. Limitations to history : Dementia .     Benedicto Webster is a 89 y.o. male with a significant PMHx of advanced dementia, CKD, diabetes, hard of hearing, and other comorbidities as listed below, presenting [facility] department today at the recommendation of patient's doctor?  Not clear if orthopedic or PCP?  Because he had abnormal x-rays after a fall 4 days ago.  Sent here for further workup.  History of dementia.  Patient just is sleeping after Fentanyl by EMS, but anywhere you touch him he says \"ow!\"  He does not cooperate with triage, ripping off lines and mask.  He got 50 of fentanyl en route by EMS, partially to calm him down for transport, but also for his pain, and he was also intermittently following sleeping on the department and we have to place him on nasal cannula.  Otherwise, history is extremely limited.  It is not clear if there is a head CT ever done after this fall.       Nursing Notes were all reviewed and agreed with or any disagreements were addressed in the HPI.      MEDICAL HISTORY  Past Medical History:   Diagnosis Date    Actinic keratosis     Anemia     Anxiety     ASHD (arteriosclerotic heart disease)     Basal cell carcinoma of skin     Chronic kidney disease (CKD), stage III (moderate) (HCC)     COVID-19     Dementia (HCC)     Diabetes  status: Every Day     Current packs/day: 0.50     Types: Cigarettes    Smokeless tobacco: Never   Substance Use Topics    Alcohol use: No    Drug use: No       SCREENINGS            CIWA Assessment  BP: (!) 175/80  Pulse: 78             REVIEW OF SYSTEMS:    Review of Systems   Musculoskeletal:  Positive for back pain.     Positives and Pertinent negatives as per HPI.    _____________________________________      PHYSICAL EXAM:     Vitals:    04/23/24 1804 04/23/24 1810   BP: (!) 175/80    Pulse: 80 78   Resp: 18 18   Temp: 97.8 °F (36.6 °C)    SpO2: 90% 91%       Physical Exam  Constitutional:       Appearance: Normal appearance. He is normal weight. He is ill-appearing. He is not diaphoretic.      Comments: Chronically ill man.  To be quite clear, and is very difficult to do a physical exam on this gentleman, because he is quite combative anytime I talked him.  He has scattered bruising on his extremities. ***    HENT:      Head: Normocephalic.      Right Ear: External ear normal.      Left Ear: External ear normal.      Nose: Nose normal.      Mouth/Throat:      Mouth: Mucous membranes are moist.      Pharynx: Oropharynx is clear.   Eyes:      General:         Right eye: No discharge.         Left eye: No discharge.      Conjunctiva/sclera: Conjunctivae normal.   Pulmonary:      Effort: Pulmonary effort is normal. No respiratory distress.   Musculoskeletal:      Cervical back: Normal range of motion and neck supple.   Skin:     General: Skin is warm and dry.   Neurological:      General: No focal deficit present.      Mental Status: He is alert and oriented to person, place, and time.   Psychiatric:         Mood and Affect: Mood normal.         Thought Content: Thought content normal.         DIAGNOSTIC RESULTS:  LABS:    Labs Reviewed - No data to display    When ordered, only abnormal lab results are displayed. All other labs were within normal range or not returned as of this dictation.      Independent EKG

## 2024-04-24 VITALS
SYSTOLIC BLOOD PRESSURE: 148 MMHG | OXYGEN SATURATION: 91 % | TEMPERATURE: 97.8 F | RESPIRATION RATE: 27 BRPM | DIASTOLIC BLOOD PRESSURE: 73 MMHG | HEART RATE: 93 BPM

## 2024-04-24 NOTE — ED NOTES
2021 adt20 sent for neurosurgery consult   2033 consult completed with call back from [facility] Dr. Downey connected with Neurosurgery

## 2024-04-24 NOTE — PLAN OF CARE
89 year old gentleman with severe dementia who is non ambulatory at baseline who fell during a transfer a few days ago and had abnormal spinal x-rays and was sent to the ED for evaluation.    CT of L spine shows acute fracture of L1 with 30% height loss and 2mm of retropulsion and a chronic L3 fracture that has now completely lost central height compared to a L spine CT from July of 2023.        Given his history and age he is not a surgical candidate.    Recommend TLSO brace on at all times, especially for tranfers but can be taken off for hygiene.    Ideally would want to obtain upright x-rays once brace is on however not sure if this is attainable given his history. For follow up, should have repeat L spine X-ray completed if able.    Above case and plan discussed with Dr. Priest.    Jay SANTOS - CNP  Neurology and Neuro critical care  [phone]

## 2024-04-24 NOTE — PROGRESS NOTES
The [facility] returned call at this time and states they do not come out to [place] this late in the evening. Gave number for one call to see if they have availability.

## 2024-04-24 NOTE — PROGRESS NOTES
Transport service here to take patient back to SNF at this time. Patient transferred to [facility] without incident.

## 2024-04-24 NOTE — PROGRESS NOTES
One call [facility] service called and states they do not come out this late in the evening but will go to the SNF in the morning to place the brace. Dr. Downey notified.

## 2024-04-24 NOTE — DISCHARGE INSTRUCTIONS
Please have him wear the TLSO brace during transfers, and during daily activities, but may remove for hygiene.  Follow-up with [facility].